# Patient Record
Sex: FEMALE | Race: WHITE | NOT HISPANIC OR LATINO | Employment: OTHER | URBAN - METROPOLITAN AREA
[De-identification: names, ages, dates, MRNs, and addresses within clinical notes are randomized per-mention and may not be internally consistent; named-entity substitution may affect disease eponyms.]

---

## 2017-02-22 ASSESSMENT — ENCOUNTER SYMPTOMS
RESPIRATORY PAIN: 0
WEIGHT GAIN: 0
WHEEZING: 0
DECREASED APPETITE: 0
EYE WATERING: 1
FEVER: 1
SNORES LOUDLY: 0
COUGH DISTURBING SLEEP: 0
SMELL DISTURBANCE: 0
SORE THROAT: 0
SHORTNESS OF BREATH: 0
EYE IRRITATION: 1
FATIGUE: 1
POLYDIPSIA: 0
SPUTUM PRODUCTION: 1
DOUBLE VISION: 0
COUGH: 1
WEIGHT LOSS: 0
HALLUCINATIONS: 0
TASTE DISTURBANCE: 0
HEMOPTYSIS: 0
EYE REDNESS: 0
ALTERED TEMPERATURE REGULATION: 1
NIGHT SWEATS: 0
TROUBLE SWALLOWING: 0
POSTURAL DYSPNEA: 0
NECK MASS: 0
SINUS CONGESTION: 1
EYE PAIN: 0
DYSPNEA ON EXERTION: 0
CHILLS: 0
INCREASED ENERGY: 0
HOARSE VOICE: 0
SINUS PAIN: 0
POLYPHAGIA: 0

## 2017-02-27 ENCOUNTER — ONCOLOGY VISIT (OUTPATIENT)
Dept: ONCOLOGY | Facility: CLINIC | Age: 77
End: 2017-02-27
Attending: PHYSICIAN ASSISTANT
Payer: COMMERCIAL

## 2017-02-27 VITALS
BODY MASS INDEX: 21.3 KG/M2 | HEART RATE: 68 BPM | RESPIRATION RATE: 16 BRPM | DIASTOLIC BLOOD PRESSURE: 74 MMHG | TEMPERATURE: 97.8 F | WEIGHT: 148.8 LBS | SYSTOLIC BLOOD PRESSURE: 118 MMHG | HEIGHT: 70 IN | OXYGEN SATURATION: 99 %

## 2017-02-27 DIAGNOSIS — C50.912 MALIGNANT NEOPLASM OF LEFT FEMALE BREAST, UNSPECIFIED SITE OF BREAST: Primary | ICD-10-CM

## 2017-02-27 PROCEDURE — 99214 OFFICE O/P EST MOD 30 MIN: CPT | Mod: ZP | Performed by: PHYSICIAN ASSISTANT

## 2017-02-27 PROCEDURE — 99212 OFFICE O/P EST SF 10 MIN: CPT | Mod: ZF

## 2017-02-27 ASSESSMENT — PAIN SCALES - GENERAL: PAINLEVEL: NO PAIN (0)

## 2017-02-27 NOTE — LETTER
2/27/2017      RE: Radhika Williamson  [address]  [place] [address]       DIAGNOSIS:  Stage I (T1 N0 M0) left breast cancer.  ER/OR was positive. HER2 was equivocal by FISH. Her Her 2 IHC was 1+.  Ms. Williamson had a screening mammogram which noted an abnormality in her left breast in 12/2015. She had a left breast biopsy on 12/17/2015. Pathology showed infiltrating ductal carcinoma, grade 2. There was associated DCIS. ER/OR was positive. HER2 was equivocal by FISH. Her Her 2 IHC was 1+. She had a breast MRI on 01/26/2016. MRI showed a left breast spiculated mass of 1.4 x 1.6 x 1.5 cm. There were no abnormal axillary lymph nodes. The right side showed no concerns. She had a left lumpectomy and sentinel lymph node biopsy on 02/24/2016. Pathology revealed a 1.7-cm tumor, grade 3, infiltrating ductal carcinoma. There was associated DCIS. Zero of 1 lymph node was positive. There were clear margins.  She had an Oncotype DX score of 19. She completed left breast radiation of 5240 cGy on 05/06/2016. She started Arimidex in 05/2016.     INTERVAL HISTORY:  Radhika Williamson returns to the clinic today for followup of her breast carcinoma.  Overall, she is feeling well at this time.  She did develop in the last 30 days influenza A.  She developed a high-grade fever.  She went in within 24 hours, and was prescribed Tamiflu and did fine.  She remains on the anastrozole.  She denies any hot flashes or joint pains.  She does continue to have some fatigue.  She is going to [facility] 2 days a week.  She does cristina chi and yoga.  She was interested in adding in some weight training as well.  She has no changes to her hearing.  She continues to have hearing loss and tinnitus, as well as a hearing aid.  She does have occasional bleeding of the gums.  She has dry, watery eyes, but uses Systane with good results.      She states that she now saw a nutritionist through Dr. Reyes's office, and this went well.  She was also  referred to a cognitive behavioral therapist to help with her sleep, and she is off most of the medications to help her sleep that she has been on it for years.     ROS:  Answers for HPI/ROS submitted by the patient on 2/22/2017   General Symptoms: Yes  Skin Symptoms: No  HENT Symptoms: Yes  EYE SYMPTOMS: Yes  HEART SYMPTOMS: No  LUNG SYMPTOMS: Yes  INTESTINAL SYMPTOMS: No  URINARY SYMPTOMS: No  GYNECOLOGIC SYMPTOMS: No  BREAST SYMPTOMS: No  SKELETAL SYMPTOMS: No  BLOOD SYMPTOMS: No  NERVOUS SYSTEM SYMPTOMS: No  MENTAL HEALTH SYMPTOMS: No  Fever: Yes  Loss of appetite: No  Weight loss: No  Weight gain: No  Fatigue: Yes  Night sweats: No  Chills: No  Increased stress: No  Excessive hunger: No  Excessive thirst: No  Feeling hot or cold when others believe the temperature is normal: Yes  Loss of height: No  Post-operative complications: No  Surgical site pain: No  Hallucinations: No  Change in or Loss of Energy: No  Hyperactivity: No  Confusion: No  Ear pain: No  Ear discharge: No  Hearing loss: Yes  Tinnitus: Yes  Nosebleeds: No  Congestion: Yes  Sinus pain: No  Trouble swallowing: No   Voice hoarseness: No  Mouth sores: No  Sore throat: No  Tooth pain: No  Gum tenderness: No  Bleeding gums: Yes  Change in taste: No  Change in sense of smell: No  Dry mouth: No  Hearing aid used: Yes  Neck lump: No  Eye pain: No  Vision loss: No  Dry eyes: Yes  Watery eyes: Yes  Eye bulging: No  Double vision: No  Flashing of lights: No  Spots: No  Floaters: No  Redness: No  Crossed eyes: No  Tunnel Vision: No  Yellowing of eyes: No  Eye irritation: Yes  Cough: Yes  Sputum or phlegm: Yes  Coughing up blood: No  Difficulty breating or shortness of breath: No  Snoring: No  Wheezing: No  Difficulty breathing on exertion: No  Respiratory pain: No  Nighttime Cough: No  Difficulty breathing when lying flat: No    MEDICATIONS:   Current Outpatient Prescriptions   Medication Sig Dispense Refill     Nutritional Supplements (IMMUNE ENHANCE PO)  "daily        clobetasol (TEMOVATE) 0.05 % ointment Apply to affected areas on arms and legs twice a day for up to two weeks at a time as needed for severe flares.       desonide (DESOWEN) 0.05 % cream Apply to affected areas on ears and groin twice a day as needed.       anastrozole (ARIMIDEX) 1 MG tablet Take 1 tablet (1 mg) by mouth daily 90 tablet 3     fluocinonide (LIDEX) 0.05 % external solution apply to scalp twice daily as needed       calcium citrate-vitamin D (CITRACAL) 315-250 MG-UNIT TABS Take 2 tablets by mouth daily 500 mg daily       Probiotic Product (PROBIOTIC DAILY) CAPS Take 2 capsules by mouth daily       KRILL OIL PO Take 500 mg by mouth daily        Cholecalciferol (VITAMIN D3 PO) Take 2,000 Units by mouth daily        multivitamin, therapeutic with minerals (THERA-VIT-M) TABS Take 1 tablet by mouth daily       Ranitidine HCl (ZANTAC PO) Take 150 mg by mouth as needed        TRAZODONE HCL PO Take 50 mg by mouth At Bedtime       Valerian 500 MG CAPS Take by mouth At Bedtime        MELATONIN PO Take 3 mg by mouth nightly as needed           ALLERGIES:    Allergies   Allergen Reactions     Molds & Smuts Other (See Comments)     Other reaction(s): Other, see comments     Dust Mite Extract Other (See Comments)     Other reaction(s): Other, see comments     Dust Mites      Mold        PHYSICAL EXAMINATION:  Vitals: /74 (BP Location: Left arm, Patient Position: Chair, Cuff Size: Adult Regular)  Pulse 68  Temp 97.8  F (36.6  C) (Oral)  Resp 16  Ht 1.785 m (5' 10.28\")  Wt 67.5 kg (148 lb 12.8 oz)  SpO2 99%  BMI 21.18 kg/m2  GENERAL:  A pleasant person in no acute distress.   HEENT:  Sclerae are nonicteric.     NECK:  Supple.   LYMPH NODES:  No peripheral lymphadenopathy noted in the axillary, supraclavicular, or cervical regions.   LUNGS:  Clear to auscultation bilaterally.   HEART:  Regular rate and rhythm, with no murmur appreciated.   ABDOMEN:  Bowel sounds are active.  Soft and nontender.  No " hepatosplenomegaly or other masses appreciated.  LOWER EXTREMITIES:  Without pitting edema to the knees bilaterally.   NEUROLOGICAL:  Alert/orientated/able to answer all questions.  CN grossly intact.  BREAST: Right breast normal to inspection, no masses. Left breast, well healed surgical incision 2-3 o'clock position. Left nipple is notable for a scab, no nipple discharge.  No masses.       IMPRESSION/PLAN:   1.  Stage I (T1 N0 M0) left breast carcinoma, ER/NC-positive, HER2 equivocal by FISH, 1+ by IHC.  Ms. Williamson continues to do well at this time.  She does not have any signs or symptoms that would suggest recurrence of her breast carcinoma.  Plan to continue the Arimidex 1 mg daily.  She had her bilateral mammogram in 11/2016, which showed benign findings.  We will plan to continue on an annual basis.  I discussed an exercise program, and asked her to increase her cardiovascular exercise to 150 minutes per week.  She is also interested in weight training, and I will refer her to cancer rehab.  The patient already has a followup appointment scheduled with Dr. Alves in 3 months.   2.  Bone health.  She does weightbearing exercises 2-3 days a week.  She also takes calcium with vitamin D.  DEXA scan in 04/2015 showed normal bone density.      If there are no interval concerns, the patient will follow up in 3 months.         Nupur Nazario PA-C

## 2017-02-27 NOTE — MR AVS SNAPSHOT
After Visit Summary   2/27/2017    Radhika Williamson    MRN: [id number]           Patient Information     Date Of Birth          1940        Visit Information        Provider Department      2/27/2017 9:30 AM Nupur Nazario PA-C [facility]        Today's Diagnoses     Malignant neoplasm of left female breast, unspecified site of breast (H)    -  1       Follow-ups after your visit        Additional Services     PHYSICAL THERAPY REFERRAL       *This therapy referral will be filtered to a centralized scheduling office at [facility] and the patient will receive a call to schedule an appointment at a [facility] location most convenient for them. *     [facility] provides Physical Therapy evaluation and treatment and many specialty services across the [facility] system.  If requesting a specialty program, please choose from the list below.    If you have not heard from the scheduling office within 2 business days, please call [phone] for all locations, with the exception of [place], please call [phone].  Treatment: Evaluation & Treatment  Special Instructions/Modalities: Strength training, cardio exercise program.  Special Programs: Cancer Rehabilitation (PT and OT Evaluate & Treat)    Please be aware that coverage of these services is subject to the terms and limitations of your health insurance plan.  Call member services at your health plan with any benefit or coverage questions.      **Note to Provider:  If you are referring outside of [facility] for the therapy appointment, please list the name of the location in the  special instructions  above, print the referral and give to the patient to schedule the appointment.                  Your next 10 appointments already scheduled     Mar 13, 2017  9:45 AM CDT   Evaluation with Chio Chaparro, PT   [facility] ([facility])     "[address] [facility]  1st Floor  [address]  [place] [address]   [phone]            May 22, 2017  9:00 AM CDT   (Arrive by 8:45 AM)   Return Visit with Cindy Alves MD   [facility] ([facility])    [address]  2nd Floor  [place] [id number] [phone]              Who to contact     If you have questions or need follow up information about today's clinic visit or your schedule please contact [facility] directly at [phone].  Normal or non-critical lab and imaging results will be communicated to you by MyChart, letter or phone within 4 business days after the clinic has received the results. If you do not hear from us within 7 days, please contact the clinic through [employer] or phone. If you have a critical or abnormal lab result, we will notify you by phone as soon as possible.  Submit refill requests through [employer] or call your pharmacy and they will forward the refill request to us. Please allow 3 business days for your refill to be completed.          Additional Information About Your Visit        Synker[employer]aloo Information     [employer] gives you secure access to your electronic health record. If you see a primary care provider, you can also send messages to your care team and make appointments. If you have questions, please call your primary care clinic.  If you do not have a primary care provider, please call [phone] and they will assist you.        Care EveryWhere ID     This is your Care EveryWhere ID. This could be used by other organizations to access your [place] medical records  [id number]        Your Vitals Were     Pulse Temperature Respirations Height Pulse Oximetry BMI (Body Mass Index)    68 97.8  F (36.6  C) (Oral) 16 1.785 m (5' 10.28\") 99% 21.18 kg/m2       Blood Pressure from Last 3 Encounters:   02/27/17 118/74   11/28/16 130/70   08/23/16 129/66    Weight from Last 3 Encounters: "   02/27/17 67.5 kg (148 lb 12.8 oz)   11/28/16 66.5 kg (146 lb 8 oz)   08/23/16 66.2 kg (146 lb)              We Performed the Following     PHYSICAL THERAPY REFERRAL        Primary Care Provider Office Phone # Fax #    Mckenna Fish [phone]       [address] [address]        Thank you!     Thank you for choosing [facility]  for your care. Our goal is always to provide you with excellent care. Hearing back from our patients is one way we can continue to improve our services. Please take a few minutes to complete the written survey that you may receive in the mail after your visit with us. Thank you!             Your Updated Medication List - Protect others around you: Learn how to safely use, store and throw away your medicines at www.disposemymeds.org.          This list is accurate as of: 2/27/17 11:59 PM.  Always use your most recent med list.                   Brand Name Dispense Instructions for use    anastrozole 1 MG tablet    ARIMIDEX    90 tablet    Take 1 tablet (1 mg) by mouth daily       calcium citrate-vitamin D 315-250 MG-UNIT Tabs per tablet    CITRACAL     Take 2 tablets by mouth daily 500 mg daily       clobetasol 0.05 % ointment    TEMOVATE     Apply to affected areas on arms and legs twice a day for up to two weeks at a time as needed for severe flares.       desonide 0.05 % cream    DESOWEN     Apply to affected areas on ears and groin twice a day as needed.       fluocinonide 0.05 % solution    LIDEX     apply to scalp twice daily as needed       IMMUNE ENHANCE PO      daily       KRILL OIL PO      Take 500 mg by mouth daily       MELATONIN PO      Take 3 mg by mouth nightly as needed       multivitamin, therapeutic with minerals Tabs tablet      Take 1 tablet by mouth daily       PROBIOTIC DAILY Caps      Take 2 capsules by mouth daily       TRAZODONE HCL PO      Take 50 mg by mouth At Bedtime       Valerian 500 MG  Caps      Take by mouth At Bedtime       VITAMIN D3 PO      Take 2,000 Units by mouth daily       ZANTAC PO      Take 150 mg by mouth as needed

## 2017-02-27 NOTE — NURSING NOTE
"Radhika Williamson is a 76 year old female who presents for:  Chief Complaint   Patient presents with     Oncology Clinic Visit     Breast Ca        Initial Vitals:  /74 (BP Location: Left arm, Patient Position: Chair, Cuff Size: Adult Regular)  Pulse 68  Temp 97.8  F (36.6  C) (Oral)  Resp 16  Ht 1.785 m (5' 10.28\")  Wt 67.5 kg (148 lb 12.8 oz)  SpO2 99%  BMI 21.18 kg/m2 Estimated body mass index is 21.18 kg/(m^2) as calculated from the following:    Height as of this encounter: 1.785 m (5' 10.28\").    Weight as of this encounter: 67.5 kg (148 lb 12.8 oz).. Body surface area is 1.83 meters squared. BP completed using cuff size: regular  No Pain (0) No LMP recorded. Patient is postmenopausal. Allergies and medications reviewed.     Medications: Medication refills not needed today.  Pharmacy name entered into EPIC: Data Unavailable    Comments:  Anastrazole    7 minutes for nursing intake (face to face time)   Kaylie Marcos LPN        "

## 2017-02-27 NOTE — PROGRESS NOTES
DIAGNOSIS:  Stage I (T1 N0 M0) left breast cancer.  ER/ND was positive. HER2 was equivocal by FISH. Her Her 2 IHC was 1+.  Ms. Williamson had a screening mammogram which noted an abnormality in her left breast in 12/2015. She had a left breast biopsy on 12/17/2015. Pathology showed infiltrating ductal carcinoma, grade 2. There was associated DCIS. ER/ND was positive. HER2 was equivocal by FISH. Her Her 2 IHC was 1+. She had a breast MRI on 01/26/2016. MRI showed a left breast spiculated mass of 1.4 x 1.6 x 1.5 cm. There were no abnormal axillary lymph nodes. The right side showed no concerns. She had a left lumpectomy and sentinel lymph node biopsy on 02/24/2016. Pathology revealed a 1.7-cm tumor, grade 3, infiltrating ductal carcinoma. There was associated DCIS. Zero of 1 lymph node was positive. There were clear margins.  She had an Oncotype DX score of 19. She completed left breast radiation of 5240 cGy on 05/06/2016. She started Arimidex in 05/2016.     INTERVAL HISTORY:  Radhika Williamson returns to the clinic today for followup of her breast carcinoma.  Overall, she is feeling well at this time.  She did develop in the last 30 days influenza A.  She developed a high-grade fever.  She went in within 24 hours, and was prescribed Tamiflu and did fine.  She remains on the anastrozole.  She denies any hot flashes or joint pains.  She does continue to have some fatigue.  She is going to [employer] cardio 2 days a week.  She does cristina chi and yoga.  She was interested in adding in some weight training as well.  She has no changes to her hearing.  She continues to have hearing loss and tinnitus, as well as a hearing aid.  She does have occasional bleeding of the gums.  She has dry, watery eyes, but uses Systane with good results.      She states that she now saw a nutritionist through Dr. Reyes's office, and this went well.  She was also referred to a cognitive behavioral therapist to help with her sleep, and she is  off most of the medications to help her sleep that she has been on it for years.     ROS:  Answers for HPI/ROS submitted by the patient on 2/22/2017   General Symptoms: Yes  Skin Symptoms: No  HENT Symptoms: Yes  EYE SYMPTOMS: Yes  HEART SYMPTOMS: No  LUNG SYMPTOMS: Yes  INTESTINAL SYMPTOMS: No  URINARY SYMPTOMS: No  GYNECOLOGIC SYMPTOMS: No  BREAST SYMPTOMS: No  SKELETAL SYMPTOMS: No  BLOOD SYMPTOMS: No  NERVOUS SYSTEM SYMPTOMS: No  MENTAL HEALTH SYMPTOMS: No  Fever: Yes  Loss of appetite: No  Weight loss: No  Weight gain: No  Fatigue: Yes  Night sweats: No  Chills: No  Increased stress: No  Excessive hunger: No  Excessive thirst: No  Feeling hot or cold when others believe the temperature is normal: Yes  Loss of height: No  Post-operative complications: No  Surgical site pain: No  Hallucinations: No  Change in or Loss of Energy: No  Hyperactivity: No  Confusion: No  Ear pain: No  Ear discharge: No  Hearing loss: Yes  Tinnitus: Yes  Nosebleeds: No  Congestion: Yes  Sinus pain: No  Trouble swallowing: No   Voice hoarseness: No  Mouth sores: No  Sore throat: No  Tooth pain: No  Gum tenderness: No  Bleeding gums: Yes  Change in taste: No  Change in sense of smell: No  Dry mouth: No  Hearing aid used: Yes  Neck lump: No  Eye pain: No  Vision loss: No  Dry eyes: Yes  Watery eyes: Yes  Eye bulging: No  Double vision: No  Flashing of lights: No  Spots: No  Floaters: No  Redness: No  Crossed eyes: No  Tunnel Vision: No  Yellowing of eyes: No  Eye irritation: Yes  Cough: Yes  Sputum or phlegm: Yes  Coughing up blood: No  Difficulty breating or shortness of breath: No  Snoring: No  Wheezing: No  Difficulty breathing on exertion: No  Respiratory pain: No  Nighttime Cough: No  Difficulty breathing when lying flat: No    MEDICATIONS:   Current Outpatient Prescriptions   Medication Sig Dispense Refill     Nutritional Supplements (IMMUNE ENHANCE PO) daily        clobetasol (TEMOVATE) 0.05 % ointment Apply to affected areas on  "arms and legs twice a day for up to two weeks at a time as needed for severe flares.       desonide (DESOWEN) 0.05 % cream Apply to affected areas on ears and groin twice a day as needed.       anastrozole (ARIMIDEX) 1 MG tablet Take 1 tablet (1 mg) by mouth daily 90 tablet 3     fluocinonide (LIDEX) 0.05 % external solution apply to scalp twice daily as needed       calcium citrate-vitamin D (CITRACAL) 315-250 MG-UNIT TABS Take 2 tablets by mouth daily 500 mg daily       Probiotic Product (PROBIOTIC DAILY) CAPS Take 2 capsules by mouth daily       KRILL OIL PO Take 500 mg by mouth daily        Cholecalciferol (VITAMIN D3 PO) Take 2,000 Units by mouth daily        multivitamin, therapeutic with minerals (THERA-VIT-M) TABS Take 1 tablet by mouth daily       Ranitidine HCl (ZANTAC PO) Take 150 mg by mouth as needed        TRAZODONE HCL PO Take 50 mg by mouth At Bedtime       Valerian 500 MG CAPS Take by mouth At Bedtime        MELATONIN PO Take 3 mg by mouth nightly as needed           ALLERGIES:    Allergies   Allergen Reactions     Molds & Smuts Other (See Comments)     Other reaction(s): Other, see comments     Dust Mite Extract Other (See Comments)     Other reaction(s): Other, see comments     Dust Mites      Mold        PHYSICAL EXAMINATION:  Vitals: /74 (BP Location: Left arm, Patient Position: Chair, Cuff Size: Adult Regular)  Pulse 68  Temp 97.8  F (36.6  C) (Oral)  Resp 16  Ht 1.785 m (5' 10.28\")  Wt 67.5 kg (148 lb 12.8 oz)  SpO2 99%  BMI 21.18 kg/m2  GENERAL:  A pleasant person in no acute distress.   HEENT:  Sclerae are nonicteric.     NECK:  Supple.   LYMPH NODES:  No peripheral lymphadenopathy noted in the axillary, supraclavicular, or cervical regions.   LUNGS:  Clear to auscultation bilaterally.   HEART:  Regular rate and rhythm, with no murmur appreciated.   ABDOMEN:  Bowel sounds are active.  Soft and nontender.  No hepatosplenomegaly or other masses appreciated.  LOWER EXTREMITIES:  Without " pitting edema to the knees bilaterally.   NEUROLOGICAL:  Alert/orientated/able to answer all questions.  CN grossly intact.  BREAST: Right breast normal to inspection, no masses. Left breast, well healed surgical incision 2-3 o'clock position. Left nipple is notable for a scab, no nipple discharge.  No masses.       IMPRESSION/PLAN:   1.  Stage I (T1 N0 M0) left breast carcinoma, ER/TN-positive, HER2 equivocal by FISH, 1+ by IHC.  Ms. Williamson continues to do well at this time.  She does not have any signs or symptoms that would suggest recurrence of her breast carcinoma.  Plan to continue the Arimidex 1 mg daily.  She had her bilateral mammogram in 11/2016, which showed benign findings.  We will plan to continue on an annual basis.  I discussed an exercise program, and asked her to increase her cardiovascular exercise to 150 minutes per week.  She is also interested in weight training, and I will refer her to cancer rehab.  The patient already has a followup appointment scheduled with Dr. Alves in 3 months.   2.  Bone health.  She does weightbearing exercises 2-3 days a week.  She also takes calcium with vitamin D.  DEXA scan in 04/2015 showed normal bone density.      If there are no interval concerns, the patient will follow up in 3 months.

## 2017-03-13 ENCOUNTER — HOSPITAL ENCOUNTER (OUTPATIENT)
Dept: PHYSICAL THERAPY | Facility: CLINIC | Age: 77
Setting detail: THERAPIES SERIES
End: 2017-03-13
Attending: PHYSICIAN ASSISTANT
Payer: MEDICARE

## 2017-03-13 PROCEDURE — 40000360 ZZHC STATISTIC PT CANCER REHAB VISIT: Performed by: PHYSICAL THERAPIST

## 2017-03-13 PROCEDURE — G8978 MOBILITY CURRENT STATUS: HCPCS | Mod: GP,CJ | Performed by: PHYSICAL THERAPIST

## 2017-03-13 PROCEDURE — 97110 THERAPEUTIC EXERCISES: CPT | Mod: GP | Performed by: PHYSICAL THERAPIST

## 2017-03-13 PROCEDURE — G8979 MOBILITY GOAL STATUS: HCPCS | Mod: GP,CI | Performed by: PHYSICAL THERAPIST

## 2017-03-13 PROCEDURE — 97535 SELF CARE MNGMENT TRAINING: CPT | Mod: GP | Performed by: PHYSICAL THERAPIST

## 2017-03-13 PROCEDURE — 97162 PT EVAL MOD COMPLEX 30 MIN: CPT | Mod: GP | Performed by: PHYSICAL THERAPIST

## 2017-03-13 PROCEDURE — 97161 PT EVAL LOW COMPLEX 20 MIN: CPT | Mod: GP | Performed by: PHYSICAL THERAPIST

## 2017-03-13 ASSESSMENT — 6 MINUTE WALK TEST (6MWT): TOTAL DISTANCE WALKED (METERS): 513

## 2017-03-15 ENCOUNTER — HOSPITAL ENCOUNTER (OUTPATIENT)
Dept: PHYSICAL THERAPY | Facility: CLINIC | Age: 77
Setting detail: THERAPIES SERIES
End: 2017-03-15
Attending: PHYSICIAN ASSISTANT
Payer: MEDICARE

## 2017-03-15 PROCEDURE — 40000360 ZZHC STATISTIC PT CANCER REHAB VISIT: Performed by: PHYSICAL THERAPIST

## 2017-03-15 PROCEDURE — 97110 THERAPEUTIC EXERCISES: CPT | Mod: GP | Performed by: PHYSICAL THERAPIST

## 2017-03-15 ASSESSMENT — 6 MINUTE WALK TEST (6MWT): TOTAL DISTANCE WALKED (METERS): 513

## 2017-03-17 NOTE — PROGRESS NOTES
" 03/13/17 1900   Quick Adds   Quick Adds Certification   Type of Visit Initial OP PT Evaluation   General Information   Start of Care Date 03/13/17   Referring Physician Nupur Nazario PA-C   Orders Evaluate and Treat as Indicated   Additional Orders strength training, cardio exercise program   Order Date 03/27/17   Medical Diagnosis malignant neoplasm of left female breast, unspecified site of breast   Onset of illness/injury or Date of Surgery 02/24/16   Precautions/Limitations no known precautions/limitations   Surgical/Medical history reviewed Yes   Pertinent history of current problem (include personal factors and/or comorbidities that impact the POC) patient dx with left breast cancer 12/2015, left lumpectomy and sentinel LN biopsy 2/24/16 (0/1 LN positive), radiation completed 5/6/2016. other medical hx includes: sleep apnea (cpap). current exercise program consists of silver sneakers 2 days/week, cristina chi 1x/week, yoga 1x/week.    Diagnostic Tests Bone Scan   Bone Scan Results (Dexa scan in 4/2015 showed normal bone density)   Current Community Support Family/friend caregiver   Patient role/Employment history Retired   Living environment [place]   Patient/Family Goals Statement patient started on arimidex 5/2016 \"I want to make sure I'm doing everything possible to keep my bones strong because of the inhibitor\"    General Information Comments currently patient performs 45 minutes of yoga 1x/week, 90 minutes of cristina chi 1x/week,  silver sneakers 2x/week (20 minutes of aerobics, 8-10 minutes of light UE exercises, 8-10 minutes of pilates stretching) \"I also stay sometimes after the [facility] and do some leg exercises on machines\"    Fall Risk Screen   Fall screen completed by PT   Per patient - Fall 2 or more times in past year? No   Per patient - Fall with injury in past year? Yes   Timed Up and Go score (seconds) 6.94   Is patient a fall risk? No   Fall screen comments patient " "reporting fell because [facility] recently   System Outcome Measures   Outcome Measures Cancer Rehab   FACIT Fatigue Subscale (score out of 52). The higher the score, the better the QOL. 49   Six Minute Walk (meters). An increase of 70 or more meters indicates statistically significant change. 513   Functional Scales   Functional Scales and Outcomes FACIT: 49/52 DIFFICULTY: A LITTLE BIT: i feel fatigue, i feel tired, i need to sleep during the day. LEFS: DIFFICULTY: MODERATE: running on uneven ground. A LITTLE BIT OF DIFFICULTY: putting on shoes/socks, performing heavy activites around home, making sharp turns while running fast. SPADI: PAIN: at its worse 2/10, while lying on involved side 2/10, DIFFICULTY: carrying a heavy object of 10 pounds 2/10   Pain   Patient currently in pain Yes   Pain location left shoulder   Pain rating 5-6/10   Pain description comment \"I've had this pain even before surgery and it's only lifting weights over my head, i can do 5# on the right but only 3# on the left\"    Additional pain locations? Pain location 2   Pain location 2 left UE   Pain rating 2 2/10   Pain comments difficulty lying on involved side   Vital Signs   Vital Signs Other Vital Signs   Other Vital Signs BMI: 21.18   Cognitive Status Examination   Orientation orientation to person, place and time   Level of Consciousness alert   Follows Commands and Answers Questions 100% of the time   Personal Safety and Judgment intact   Memory intact   Integumentary   Integumentary Comments no reports of UE edema   Range of Motion (ROM)   ROM Comment WFL   Planned Therapy Interventions   Planned Therapy Interventions neuromuscular re-education;strengthening;stretching   Clinical Impression   Criteria for Skilled Therapeutic Interventions Met yes, treatment indicated   PT Diagnosis fatigue, weakness   Influenced by the following impairments pain left UE, difficuly carrying 10#, reporting fatigue, " "tired, occasional needing to sleep during the day.   Functional limitations due to impairments pain, fatigue, weakness   Clinical Presentation Evolving/Changing   Clinical Presentation Rationale patient currently performing exercise programs but reports not difficult \"there are 60 people in the silver sneakers class so we don't get individual instruction\" patient using very light weights when performing UE ex, no specific LE exercises, no specific walking program to prevent cardiovascular health. patient very motivated to learn how to advance UE weights and improve cardio training.    Clinical Decision Making (Complexity) Moderate complexity   Therapy Frequency 2 times/Week   Predicted Duration of Therapy Intervention (days/wks) 6 weeks   Risk & Benefits of therapy have been explained Yes   Patient, Family & other staff in agreement with plan of care Yes   Clinical Impression Comments patient with dx left breast cancer, lumpectomy with sentinel LND. currently performing exercise program but is not clear how to progress strengthening and aerobic exercises for maximum benefit. recommending treatment to instruct and monitor advancement for home exercise program.    Education Assessment   Preferred Learning Style Listening;Reading;Demonstration;Pictures/video   Barriers to Learning No barriers   GOALS   PT Eval Goals 1   Goal 1   Goal Identifier HOME PROGRAM   Goal Description patient to demonstrate understanding of advancement and monitoring of home strengthening and aerobic exercise program to achieve maximum benefit to prevent increased problems with lifestyle changes.    Target Date 06/10/17   Total Evaluation Time   Total Evaluation Time (Minutes) 20   Therapy Certification   Certification date from 03/13/17   Certification date to 06/10/17   Medical Diagnosis malignant neoplasm of left female breast, unspecified site of breast   Certification I certify the need for these services furnished under this plan of " treatment and while under my care.  (Physician co-signature of this document indicates review and certification of the therapy plan).

## 2017-03-17 NOTE — PROGRESS NOTES
[facility]        OUTPATIENT PHYSICAL THERAPY FUNCTIONAL EVALUATION  PLAN OF TREATMENT FOR OUTPATIENT REHABILITATION  (COMPLETE FOR INITIAL CLAIMS ONLY)  Patient's Last Name, First Name, M.I.  YOB: 1940  Radhika Williamson     Provider's Name   [facility]   Medical Record No.  [id number]     Start of Care Date:  03/13/17   Onset Date:  02/24/16   Type:     _X__PT   ____OT  ____SLP Medical Diagnosis:  malignant neoplasm of left female breast, unspecified site of breast     PT Diagnosis:  fatigue, weakness Visits from SOC:  1                              __________________________________________________________________________________  Plan of Treatment/Functional Goals:  neuromuscular re-education, strengthening, stretching           GOALS  HOME PROGRAM  patient to demonstrate understanding of advancement and monitoring of home strengthening and aerobic exercise program to achieve maximum benefit to prevent increased problems with lifestyle changes.   06/10/17             Therapy Frequency:  2 times/Week   Predicted Duration of Therapy Intervention:  6 weeks    Ciho Chaparro, PT                                    I CERTIFY THE NEED FOR THESE SERVICES FURNISHED UNDER        THIS PLAN OF TREATMENT AND WHILE UNDER MY CARE     (Physician co-signature of this document indicates review and certification of the therapy plan).                Certification Date From:  03/13/17   Certification Date To:  06/10/17    Referring Provider:  Nupur Nazario PA-C    Initial Assessment  See Epic Evaluation- Start of Care Date: 03/13/17

## 2017-03-22 ENCOUNTER — HOSPITAL ENCOUNTER (OUTPATIENT)
Dept: PHYSICAL THERAPY | Facility: CLINIC | Age: 77
Setting detail: THERAPIES SERIES
End: 2017-03-22
Attending: PHYSICIAN ASSISTANT
Payer: MEDICARE

## 2017-03-22 PROCEDURE — 40000360 ZZHC STATISTIC PT CANCER REHAB VISIT: Performed by: PHYSICAL THERAPIST

## 2017-03-22 PROCEDURE — 97110 THERAPEUTIC EXERCISES: CPT | Mod: GP | Performed by: PHYSICAL THERAPIST

## 2017-03-22 PROCEDURE — 97140 MANUAL THERAPY 1/> REGIONS: CPT | Mod: GP | Performed by: PHYSICAL THERAPIST

## 2017-03-24 ENCOUNTER — HOSPITAL ENCOUNTER (OUTPATIENT)
Dept: PHYSICAL THERAPY | Facility: CLINIC | Age: 77
Setting detail: THERAPIES SERIES
End: 2017-03-24
Attending: PHYSICIAN ASSISTANT
Payer: MEDICARE

## 2017-03-24 PROCEDURE — 40000360 ZZHC STATISTIC PT CANCER REHAB VISIT: Performed by: PHYSICAL THERAPIST

## 2017-03-24 PROCEDURE — 97110 THERAPEUTIC EXERCISES: CPT | Mod: GP | Performed by: PHYSICAL THERAPIST

## 2017-03-27 ENCOUNTER — HOSPITAL ENCOUNTER (OUTPATIENT)
Dept: PHYSICAL THERAPY | Facility: CLINIC | Age: 77
Setting detail: THERAPIES SERIES
End: 2017-03-27
Attending: PHYSICIAN ASSISTANT
Payer: MEDICARE

## 2017-03-27 PROCEDURE — 97140 MANUAL THERAPY 1/> REGIONS: CPT | Mod: GP | Performed by: PHYSICAL THERAPIST

## 2017-03-27 PROCEDURE — 97110 THERAPEUTIC EXERCISES: CPT | Mod: GP | Performed by: PHYSICAL THERAPIST

## 2017-03-27 PROCEDURE — 40000360 ZZHC STATISTIC PT CANCER REHAB VISIT: Performed by: PHYSICAL THERAPIST

## 2017-03-31 ENCOUNTER — HOSPITAL ENCOUNTER (OUTPATIENT)
Dept: PHYSICAL THERAPY | Facility: CLINIC | Age: 77
Setting detail: THERAPIES SERIES
End: 2017-03-31
Attending: PHYSICIAN ASSISTANT
Payer: MEDICARE

## 2017-03-31 PROCEDURE — 97110 THERAPEUTIC EXERCISES: CPT | Mod: GP | Performed by: PHYSICAL THERAPIST

## 2017-03-31 PROCEDURE — 40000360 ZZHC STATISTIC PT CANCER REHAB VISIT: Performed by: PHYSICAL THERAPIST

## 2017-04-03 ENCOUNTER — HOSPITAL ENCOUNTER (OUTPATIENT)
Dept: PHYSICAL THERAPY | Facility: CLINIC | Age: 77
Setting detail: THERAPIES SERIES
End: 2017-04-03
Attending: PHYSICIAN ASSISTANT
Payer: MEDICARE

## 2017-04-03 PROCEDURE — 40000360 ZZHC STATISTIC PT CANCER REHAB VISIT: Performed by: PHYSICAL THERAPIST

## 2017-04-03 PROCEDURE — 97110 THERAPEUTIC EXERCISES: CPT | Mod: GP | Performed by: PHYSICAL THERAPIST

## 2017-04-03 PROCEDURE — 97140 MANUAL THERAPY 1/> REGIONS: CPT | Mod: GP | Performed by: PHYSICAL THERAPIST

## 2017-04-05 ENCOUNTER — HOSPITAL ENCOUNTER (OUTPATIENT)
Dept: PHYSICAL THERAPY | Facility: CLINIC | Age: 77
Setting detail: THERAPIES SERIES
End: 2017-04-05
Attending: PHYSICIAN ASSISTANT
Payer: MEDICARE

## 2017-04-05 PROCEDURE — 40000360 ZZHC STATISTIC PT CANCER REHAB VISIT: Performed by: PHYSICAL THERAPIST

## 2017-04-05 PROCEDURE — 97140 MANUAL THERAPY 1/> REGIONS: CPT | Mod: GP | Performed by: PHYSICAL THERAPIST

## 2017-04-05 PROCEDURE — 97110 THERAPEUTIC EXERCISES: CPT | Mod: GP | Performed by: PHYSICAL THERAPIST

## 2017-04-12 ENCOUNTER — HOSPITAL ENCOUNTER (OUTPATIENT)
Dept: PHYSICAL THERAPY | Facility: CLINIC | Age: 77
Setting detail: THERAPIES SERIES
End: 2017-04-12
Attending: PHYSICIAN ASSISTANT
Payer: MEDICARE

## 2017-04-12 PROCEDURE — G8979 MOBILITY GOAL STATUS: HCPCS | Mod: GP,CI | Performed by: PHYSICAL THERAPIST

## 2017-04-12 PROCEDURE — 97110 THERAPEUTIC EXERCISES: CPT | Mod: GP | Performed by: PHYSICAL THERAPIST

## 2017-04-12 PROCEDURE — 97535 SELF CARE MNGMENT TRAINING: CPT | Mod: GP | Performed by: PHYSICAL THERAPIST

## 2017-04-12 PROCEDURE — 40000360 ZZHC STATISTIC PT CANCER REHAB VISIT: Performed by: PHYSICAL THERAPIST

## 2017-04-12 PROCEDURE — G8978 MOBILITY CURRENT STATUS: HCPCS | Mod: GP,CI | Performed by: PHYSICAL THERAPIST

## 2017-04-12 ASSESSMENT — 6 MINUTE WALK TEST (6MWT): TOTAL DISTANCE WALKED (METERS): 582

## 2017-04-12 NOTE — PROGRESS NOTES
Outpatient Physical Therapy Progress Note     Patient: Radhika Williamson  : 1940    Beginning/End Dates of Reporting Period:  3/13/17 to 2017    Referring Provider: Nupur [facility] Diagnosis: weakness of B LE/UEs, fatigue     Client Self Report:  Radhika reports minimal shoulder pain. She is eager to return to full yoga practice and is improving in shoulder strength which will enable her to do this.     Objective Measurements:  Objective Measure: FACIT, fatigue survey: lhigher scores indicate less fatigue.  Details: 50, initial score 49     Objective Measure: SIX MINUTE WALK  Details:  582 meters, initial : 513 meters       Outcome Measures (most recent score):  FACIT Fatigue Subscale (score out of 52). The higher the score, the better the QOL.: 50  Six Minute Walk (meters). An increase of 70 or more meters indicates statistically significant change.: 582 Pt increased 69 meters    Goals:  Goal Identifier HOME PROGRAM   Goal Description patient to demonstrate understanding of advancement and monitoring of home strengthening and aerobic exercise program to achieve maximum benefit to prevent increased problems with lifestyle management.   Target Date 06/10/17   Date Met      Progress:on going   Progress Toward Goals:   Progress this reporting period: Radhika does very well with her home exercise program. She has decreased fatigue and has increased her walking speed. She benefits from continued therapy to advance exercise program prior to returning to  so she can return to previous activities without shoulder, knee or back pain.       Plan:  Continue therapy per current plan of care.    Discharge: No

## 2017-04-14 ENCOUNTER — HOSPITAL ENCOUNTER (OUTPATIENT)
Dept: PHYSICAL THERAPY | Facility: CLINIC | Age: 77
Setting detail: THERAPIES SERIES
End: 2017-04-14
Attending: PHYSICIAN ASSISTANT
Payer: MEDICARE

## 2017-04-14 PROCEDURE — 97140 MANUAL THERAPY 1/> REGIONS: CPT | Mod: GP | Performed by: PHYSICAL THERAPIST

## 2017-04-14 PROCEDURE — 40000360 ZZHC STATISTIC PT CANCER REHAB VISIT: Performed by: PHYSICAL THERAPIST

## 2017-04-14 PROCEDURE — 97112 NEUROMUSCULAR REEDUCATION: CPT | Mod: GP | Performed by: PHYSICAL THERAPIST

## 2017-04-14 PROCEDURE — 97110 THERAPEUTIC EXERCISES: CPT | Mod: GP | Performed by: PHYSICAL THERAPIST

## 2017-04-18 ENCOUNTER — HOSPITAL ENCOUNTER (OUTPATIENT)
Dept: PHYSICAL THERAPY | Facility: CLINIC | Age: 77
Setting detail: THERAPIES SERIES
End: 2017-04-18
Attending: PHYSICIAN ASSISTANT
Payer: MEDICARE

## 2017-04-18 PROCEDURE — 97535 SELF CARE MNGMENT TRAINING: CPT | Mod: GP | Performed by: PHYSICAL THERAPIST

## 2017-04-18 PROCEDURE — 40000360 ZZHC STATISTIC PT CANCER REHAB VISIT: Performed by: PHYSICAL THERAPIST

## 2017-04-18 PROCEDURE — 97110 THERAPEUTIC EXERCISES: CPT | Mod: GP | Performed by: PHYSICAL THERAPIST

## 2017-04-21 ENCOUNTER — HOSPITAL ENCOUNTER (OUTPATIENT)
Dept: PHYSICAL THERAPY | Facility: CLINIC | Age: 77
Setting detail: THERAPIES SERIES
End: 2017-04-21
Attending: PHYSICIAN ASSISTANT
Payer: MEDICARE

## 2017-04-21 PROCEDURE — 40000360 ZZHC STATISTIC PT CANCER REHAB VISIT: Performed by: PHYSICAL THERAPIST

## 2017-04-21 PROCEDURE — 97140 MANUAL THERAPY 1/> REGIONS: CPT | Mod: GP | Performed by: PHYSICAL THERAPIST

## 2017-04-21 PROCEDURE — 97110 THERAPEUTIC EXERCISES: CPT | Mod: GP | Performed by: PHYSICAL THERAPIST

## 2017-04-26 ENCOUNTER — HOSPITAL ENCOUNTER (OUTPATIENT)
Dept: PHYSICAL THERAPY | Facility: CLINIC | Age: 77
Setting detail: THERAPIES SERIES
End: 2017-04-26
Attending: PHYSICIAN ASSISTANT
Payer: MEDICARE

## 2017-04-26 PROCEDURE — 97110 THERAPEUTIC EXERCISES: CPT | Mod: GP | Performed by: PHYSICAL THERAPIST

## 2017-04-26 PROCEDURE — 97140 MANUAL THERAPY 1/> REGIONS: CPT | Mod: GP | Performed by: PHYSICAL THERAPIST

## 2017-04-26 PROCEDURE — 40000360 ZZHC STATISTIC PT CANCER REHAB VISIT: Performed by: PHYSICAL THERAPIST

## 2017-04-28 ENCOUNTER — HOSPITAL ENCOUNTER (OUTPATIENT)
Dept: PHYSICAL THERAPY | Facility: CLINIC | Age: 77
Setting detail: THERAPIES SERIES
End: 2017-04-28
Attending: PHYSICIAN ASSISTANT
Payer: MEDICARE

## 2017-04-28 PROCEDURE — G8979 MOBILITY GOAL STATUS: HCPCS | Mod: GP,CI | Performed by: PHYSICAL THERAPIST

## 2017-04-28 PROCEDURE — G8980 MOBILITY D/C STATUS: HCPCS | Mod: GP,CH | Performed by: PHYSICAL THERAPIST

## 2017-04-28 PROCEDURE — 40000360 ZZHC STATISTIC PT CANCER REHAB VISIT: Performed by: PHYSICAL THERAPIST

## 2017-04-28 PROCEDURE — 97110 THERAPEUTIC EXERCISES: CPT | Mod: GP | Performed by: PHYSICAL THERAPIST

## 2017-04-28 PROCEDURE — 97140 MANUAL THERAPY 1/> REGIONS: CPT | Mod: GP | Performed by: PHYSICAL THERAPIST

## 2017-04-28 ASSESSMENT — 6 MINUTE WALK TEST (6MWT): TOTAL DISTANCE WALKED (METERS): 582

## 2017-04-28 NOTE — PROGRESS NOTES
Outpatient Physical Therapy Discharge Note     Patient: Radhika Williamson  : 1940    Beginning/End Dates of Reporting Period:  3/13/17 to 2017    Referring Provider: Nupur [facility] Diagnosis: B UE/LE weakness, fatigue     Client Self Report: Radhika feels confident in her ability to carry out a home and gym exercise program and knows how to advance it and increase intensity without increase in fatigue. She continues to have L posterior shoulder pain and may seek additional treatment with an orthopedic PT if pain doesn't resolve.     Objective Measurements:  Objective Measure: FACIT, a measure of fatigue. Max score= 54, higher scores indicate less fatigue.   Details: 53       Objective Measure: SIX MINUTE WALK  Details: 1920 feet, 582 meters, initial : 513 meters    Outcome Measures (most recent score):  FACIT Fatigue Subscale (score out of 52). The higher the score, the better the QOL.: 52  Six Minute Walk (meters). An increase of 70 or more meters indicates statistically significant change.: 582    Goals:  Goal Identifier HOME PROGRAM   Goal Description patient to demonstrate understanding of advancement and monitoring of home strengthening and aerobic exercise program to achieve maximum benefit to prevent increased problems with lifestyle management.   Target Date 06/10/17   Date Met   17   Progress:       Progress Toward Goals:   Progress this reporting period: Radhika has been very motivated and eager to learn. She has been instructed in weight lifting for UE/LE, balance exercises, stretches and aerobic training. She will continue to do [facility] [employer] [facility] [employer] and add these new exercises several times a week.     Plan:  Discharge from therapy.    Discharge:Yes    Reason for Discharge: Patient has met all goals.    Equipment Issued: none    Discharge Plan: Patient to continue home program.

## 2017-05-08 ASSESSMENT — ENCOUNTER SYMPTOMS
SINUS CONGESTION: 0
BACK PAIN: 0
HYPERTENSION: 0
JOINT SWELLING: 0
SORE THROAT: 0
EYE PAIN: 0
MUSCLE WEAKNESS: 0
SYNCOPE: 0
SINUS PAIN: 0
ORTHOPNEA: 0
MYALGIAS: 1
LEG PAIN: 0
MUSCLE CRAMPS: 0
SLEEP DISTURBANCES DUE TO BREATHING: 0
EXERCISE INTOLERANCE: 0
NECK PAIN: 0
TASTE DISTURBANCE: 0
SMELL DISTURBANCE: 0
NECK MASS: 0
EYE WATERING: 1
CLAUDICATION: 0
TACHYCARDIA: 1
STIFFNESS: 0
EYE REDNESS: 0
PALPITATIONS: 1
HOARSE VOICE: 0
DOUBLE VISION: 0
EYE IRRITATION: 0
TROUBLE SWALLOWING: 0
LEG SWELLING: 0
ARTHRALGIAS: 1
HYPOTENSION: 0
LIGHT-HEADEDNESS: 0

## 2017-05-22 ENCOUNTER — ONCOLOGY VISIT (OUTPATIENT)
Dept: ONCOLOGY | Facility: CLINIC | Age: 77
End: 2017-05-22
Attending: INTERNAL MEDICINE
Payer: COMMERCIAL

## 2017-05-22 VITALS
WEIGHT: 150.3 LBS | HEART RATE: 68 BPM | OXYGEN SATURATION: 97 % | DIASTOLIC BLOOD PRESSURE: 68 MMHG | SYSTOLIC BLOOD PRESSURE: 132 MMHG | RESPIRATION RATE: 16 BRPM | BODY MASS INDEX: 21.52 KG/M2 | HEIGHT: 70 IN | TEMPERATURE: 98 F

## 2017-05-22 DIAGNOSIS — C50.912 MALIGNANT NEOPLASM OF LEFT FEMALE BREAST, UNSPECIFIED SITE OF BREAST: Primary | ICD-10-CM

## 2017-05-22 DIAGNOSIS — Z12.31 ENCOUNTER FOR SCREENING MAMMOGRAM FOR MALIGNANT NEOPLASM OF BREAST: ICD-10-CM

## 2017-05-22 PROCEDURE — 99214 OFFICE O/P EST MOD 30 MIN: CPT | Mod: GC | Performed by: INTERNAL MEDICINE

## 2017-05-22 PROCEDURE — 99212 OFFICE O/P EST SF 10 MIN: CPT | Mod: ZF

## 2017-05-22 RX ORDER — PHENOL 1.4 %
1 AEROSOL, SPRAY (ML) MUCOUS MEMBRANE DAILY
COMMUNITY
End: 2017-11-27

## 2017-05-22 ASSESSMENT — PAIN SCALES - GENERAL: PAINLEVEL: NO PAIN (0)

## 2017-05-22 NOTE — MR AVS SNAPSHOT
After Visit Summary   5/22/2017    Radhika Williamson    MRN: [id number]           Patient Information     Date Of Birth          1940        Visit Information        Provider Department      5/22/2017 9:00 AM Cindy Alves MD [facility]        Today's Diagnoses     Malignant neoplasm of left female breast, unspecified site of breast (H)    -  1    Encounter for screening mammogram for malignant neoplasm of breast            Follow-ups after your visit        Your next 10 appointments already scheduled     Nov 27, 2017  9:30 AM CST   (Arrive by 9:15 AM)   MA SCREENING DIGITAL BILATERAL with [id number]   [facility] ([facility])    [address] [place] [address] [phone]           Do not use any powder, lotion or deodorant under your arms or on your breast. If you do, we will ask you to remove it before your exam.  Wear comfortable, two-piece clothing.  If you have any allergies, tell your care team.  Bring any previous mammograms from other facilities or have them mailed to the breast center. This mammogram location, [facility], now offers 3D mammography. It doesn't replace a screening mammogram and can be done with a regular screening mammogram. It is optional and not all insurances will pay for it. 3D mammography is a special kind of mammogram that produces a three-dimensional image of the breast by using low dose-xrays. 3D allows the radiologist to see the breast tissue differently from 2D, which reduces the chance of repeat testing due to overlapping breast tissue. If you are interested in have a 3D mammogram, please check with your insurance before you arrive for your exam. 3D mammography is offered to all patients. On the day of your exam you will be asked to sign a form stating yes, you wish to have 3D imaging or, no, you decline.              Future tests that were  "ordered for you today     Open Future Orders        Priority Expected Expires Ordered    MA Screening Digital Bilateral Routine  5/22/2018 5/22/2017            Who to contact     If you have questions or need follow up information about today's clinic visit or your schedule please contact [facility] directly at [phone].  Normal or non-critical lab and imaging results will be communicated to you by MyChart, letter or phone within 4 business days after the clinic has received the results. If you do not hear from us within 7 days, please contact the clinic through [employer] or phone. If you have a critical or abnormal lab result, we will notify you by phone as soon as possible.  Submit refill requests through [email or web address] or call your pharmacy and they will forward the refill request to us. Please allow 3 business days for your refill to be completed.          Additional Information About Your Visit        [employer] Information     [email or web address] gives you secure access to your electronic health record. If you see a primary care provider, you can also send messages to your care team and make appointments. If you have questions, please call your primary care clinic.  If you do not have a primary care provider, please call [phone] and they will assist you.        Care EveryWhere ID     This is your Care EveryWhere ID. This could be used by other organizations to access your [facility] medical records  [id number]        Your Vitals Were     Pulse Temperature Respirations Height Pulse Oximetry BMI (Body Mass Index)    68 98  F (36.7  C) (Oral) 16 1.785 m (5' 10.28\") 97% 21.4 kg/m2       Blood Pressure from Last 3 Encounters:   05/22/17 132/68   02/27/17 118/74   11/28/16 130/70    Weight from Last 3 Encounters:   05/22/17 68.2 kg (150 lb 4.8 oz)   02/27/17 67.5 kg (148 lb 12.8 oz)   11/28/16 66.5 kg (146 lb 8 oz)                 Today's Medication Changes          These changes are accurate as of: 5/22/17  " 9:48 AM.  If you have any questions, ask your nurse or doctor.               Stop taking these medicines if you haven't already. Please contact your care team if you have questions.     UNABLE TO FIND   Stopped by:  Cindy Alves MD                    Primary Care Provider Office Phone # Fax #    Mckenna Fish [phone]       [address] [address]        Thank you!     Thank you for choosing [facility]  for your care. Our goal is always to provide you with excellent care. Hearing back from our patients is one way we can continue to improve our services. Please take a few minutes to complete the written survey that you may receive in the mail after your visit with us. Thank you!             Your Updated Medication List - Protect others around you: Learn how to safely use, store and throw away your medicines at www.disposemymeds.org.          This list is accurate as of: 5/22/17  9:48 AM.  Always use your most recent med list.                   Brand Name Dispense Instructions for use    anastrozole 1 MG tablet    ARIMIDEX    90 tablet    Take 1 tablet (1 mg) by mouth daily       calcium carbonate 600 MG tablet    OS-WILLARD 600 mg [place]. Ca     Take 1 tablet by mouth daily       calcium citrate-vitamin D 315-250 MG-UNIT Tabs per tablet    CITRACAL     Take 2 tablets by mouth daily 500 mg daily       clobetasol 0.05 % ointment    TEMOVATE     Apply to affected areas on arms and legs twice a day for up to two weeks at a time as needed for severe flares.       desonide 0.05 % cream    DESOWEN     Apply to affected areas on ears and groin twice a day as needed.       fluocinonide 0.05 % solution    LIDEX     apply to scalp twice daily as needed       IMMUNE ENHANCE PO      daily       KRILL OIL PO      Take 500 mg by mouth daily       MELATONIN PO      Take 3 mg by mouth nightly as needed       multivitamin, therapeutic with minerals Tabs  tablet      Take 1 tablet by mouth daily       PROBIOTIC DAILY Caps      Take 2 capsules by mouth daily       TRAZODONE HCL PO      Take 50 mg by mouth At Bedtime       Valerian 500 MG Caps      Take by mouth At Bedtime       VITAMIN D3 PO      Take 2,000 Units by mouth daily       ZANTAC PO      Take 150 mg by mouth as needed Reported on 5/22/2017

## 2017-05-22 NOTE — LETTER
5/22/2017       RE: Radhika Williamson  [address]  [place] [address]     Dear Colleague,    Thank you for referring your patient, Radhika Williamson, to the [facility]. Please see a copy of my visit note below.    HISTORY OF PRESENT ILLNESS:  Follow up breast cancer     Radhika is a 77-year-old, otherwise healthy female who originally underwent a screening mammography in 12/2015.  This showed an abnormality at the 3 o'clock position and biopsy was recommended.  Biopsy was performed at [facility] on 12/17/2015 showing invasive ductal carcinoma, Randy grade 2.  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  Estrogen was high positivity, progesterone receptor high positivity, and HER2/phuong was negative with a score of 1+.  The patient was away for 6 weeks and then came in for consultation and evaluation by Dr. Luke Salmon on 01/29/2016, at which time they discussed breast conservation.  She had had an MRI at [facility] showing a normal right breast and a 1.6-cm mass at the 3 o'clock position in the left breast.  There were no suspicious lymph nodes.  She went on to have a lumpectomy and sentinel lymph node biopsy of the left breast on 02/24/2016.  The pathology revealed invasive ductal carcinoma, Randy grade 3, measuring 1.7 x 1.7 x 1.6 cm.  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  The surgical margins were negative for invasive cancer or for ductal carcinoma in situ.  This was estrogen receptor positive 90%, progesterone receptor positive 70%,.  HER2 testing was performed on the tumor.  The HER2 testing was performed by FISH revealing HER2 signals per cell of 4.2 which is considered equivocal, a HER2:CEN17 ratio of 1.2 is equivocal, and subsequent probe HER2:SMS ratio of 1.3.  Zero out of 1 lymph node had any evidence of disease, giving her final staging of a T1c N0 left breast cancer.  She did have an Oncotype sent off revealing a score of 19, giving her  risk of distant recurrence assuming she takes tamoxifen for 5 years of 12% with tamoxifen alone.      She completed adjuvant radiation under the care of Dr Grayson on 5/6/2016.  She started on daily arimedex on 5/7/2016.     INTERVAL HISTORY:  Radhika is here for followup.  She reports generally feeling well.  She is taking Arimidex with no side effects.  She completed cancer rehab last month.  She is very pleased with the results.  She did develop some left shoulder discomfort following the rehab, and this has been stable.  She has some pain when she is trying to abduct her shoulder.  She did not have any issues before.  She does not take any pain medication in this regard.  Otherwise, she was feeling quite well.  Her energy level is good.  She is exercising.  She is eating well.  She denies any fatigue.  No new bone pain, headaches, respiratory or GI symptoms, changes in her bowel or bladder habits.  She did not feel any masses on self breast exam.           ROS:    10-point review of systems is otherwise negative.      PAST MEDICAL HISTORY:  Stage I breast cancer as discussed in the HPI.       MEDICATIONS:  Reviewed     ALLERGIES:  No known drug allergies.      SOCIAL HISTORY:  She is .  She has no tobacco use or alcohol use.  She and her  like to travel frequently.  She has a daughter who is age 34 and no grandchildren.      GYNECOLOGIC HISTORY:  She went through menopause in her late 40s.  She was on hormone replacement therapy for several years.  She has been off of this for approximately 15-20 years at this time.  She is not sure of the exact date.  She has never had any abnormal mammograms previously.  She has not needed any breast biopsies in the past.       FAMILY HISTORY:  She has a maternal aunt who had breast cancer, she believes in her 40s.  Her aunt is still living at the age of 96.       PHYSICAL EXAMINATION:   /68 (BP Location: Right arm, Patient Position: Chair, Cuff Size: Adult  "Regular)  Pulse 68  Temp 98  F (36.7  C) (Oral)  Resp 16  Ht 1.785 m (5' 10.28\")  Wt 68.2 kg (150 lb 4.8 oz)  SpO2 97%  BMI 21.4 kg/m2  GENERAL:  well-appearing, NAD  HEENT: no icterus; TRIXIE; EOMI;  moist oral mucosa,  no ulcers or lesions.   NECK:  Supple.  There is no cervical, supraclavicular or axillary adenopathy.   HEART:  Regular rate and rhythm, no mumurs  LUNGS:  Clear bilaterally, non-labored breathing  ABDOMEN:  Soft, nontender, non-distended;no palpable hepatosplenomegaly.     BREASTS:  Her breast examination reveals well healed scar in the left breast at the 3 o'clock position post-lumpectomy, no masses appreciated. Right breast exam is unremarkable. No axillary LA.  NEUROLOGIC:  Unremarkable.       LABORATORY DATA:      Mammogram 11/28/16: negative     ASSESSMENT AND PLAN:      This is a 77-year-old female with a T1c N0 left breast cancer of invasive ductal carcinoma, ER positive, IL positive, HER2 negative ( equivocal by FISH, and 1+ by IHC).   Oncotype  score of 19 giving her risk of distant recurrence assuming she takes tamoxifen for 5 years of 12% with tamoxifen alone.      1.  Breast cancer. She is on adjuvant endocrine therapy alone.  She was not interested in chemotherapy.   She completed adjuvant radiation under the care of Dr Grayson on 5/6/2016.   Patient tolerates Arimidex really well. There is no evidence of recurrence on today's exam.  Her next mammogram is due in Fall 2017.    She is taking daily Vit D and Calcium and is doing weight bearing exercise twice a week.          2. Insomnia- this is resolved with behavioral therapy and she is off all insomnia meds after 40 years of medication therapy.    3. Bone health - will continue to monitor bones while on AI. Her last DEXA scan was done at [facility] on 5/5/17, she had normal bone density.    RTC in 3 months but she knows to call us with any questions in the interim    All of her questions were answered to her satisfaction    Pt seen " and d/w Dr. Long Horta MD  Hematology, Oncology Fellow      Pt was seen and evaluated by me.  Pt is doing well with no signs of recurrence.  Arimidex to continue.  Mammogram in the fall.    Cindy Alves MD

## 2017-05-22 NOTE — NURSING NOTE
"Oncology Rooming Note    May 22, 2017 9:12 AM   Radhika Williamson is a 77 year old female who presents for:    Chief Complaint   Patient presents with     Oncology Clinic Visit     Breast Ca- F/U     Initial Vitals: /68 (BP Location: Right arm, Patient Position: Chair, Cuff Size: Adult Regular)  Pulse 68  Temp 98  F (36.7  C) (Oral)  Resp 16  Ht 1.785 m (5' 10.28\")  Wt 68.2 kg (150 lb 4.8 oz)  SpO2 97%  BMI 21.4 kg/m2 Estimated body mass index is 21.4 kg/(m^2) as calculated from the following:    Height as of this encounter: 1.785 m (5' 10.28\").    Weight as of this encounter: 68.2 kg (150 lb 4.8 oz). Body surface area is 1.84 meters squared.  No Pain (0) Comment: Data Unavailable   No LMP recorded. Patient is postmenopausal.  Allergies reviewed: Yes  Medications reviewed: Yes    Medications: MEDICATION REFILLS NEEDED TODAY. Provider was notified.  Pharmacy name entered into EPIC: Data Unavailable    Clinical concerns: Patient need refills on Anastrozole and Fluocinonite solution   provider was notified.    7 minutes for nursing intake (face to face time)     Bhavya Jean CMA              "

## 2017-05-22 NOTE — PROGRESS NOTES
HISTORY OF PRESENT ILLNESS:  Follow up breast cancer     Radhika is a 77-year-old, otherwise healthy female who originally underwent a screening mammography in 12/2015.  This showed an abnormality at the 3 o'clock position and biopsy was recommended.  Biopsy was performed at [facility] on 12/17/2015 showing invasive ductal carcinoma, [place] grade 2.  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  Estrogen was high positivity, progesterone receptor high positivity, and HER2/phuong was negative with a score of 1+.  The patient was away for 6 weeks and then came in for consultation and evaluation by Dr. Luke Salmon on 01/29/2016, at which time they discussed breast conservation.  She had had an MRI at [facility] showing a normal right breast and a 1.6-cm mass at the 3 o'clock position in the left breast.  There were no suspicious lymph nodes.  She went on to have a lumpectomy and sentinel lymph node biopsy of the left breast on 02/24/2016.  The pathology revealed invasive ductal carcinoma, [place] grade 3, measuring 1.7 x 1.7 x 1.6 cm.  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  The surgical margins were negative for invasive cancer or for ductal carcinoma in situ.  This was estrogen receptor positive 90%, progesterone receptor positive 70%,.  HER2 testing was performed on the tumor.  The HER2 testing was performed by FISH revealing HER2 signals per cell of 4.2 which is considered equivocal, a HER2:CEN17 ratio of 1.2 is equivocal, and subsequent probe HER2:SMS ratio of 1.3.  Zero out of 1 lymph node had any evidence of disease, giving her final staging of a T1c N0 left breast cancer.  She did have an Oncotype sent off revealing a score of 19, giving her risk of distant recurrence assuming she takes tamoxifen for 5 years of 12% with tamoxifen alone.      She completed adjuvant radiation under the care of Dr Grayson on 5/6/2016.  She started on daily arimedex on 5/7/2016.     INTERVAL  "HISTORY:  Radhika is here for followup.  She reports generally feeling well.  She is taking Arimidex with no side effects.  She completed cancer rehab last month.  She is very pleased with the results.  She did develop some left shoulder discomfort following the rehab, and this has been stable.  She has some pain when she is trying to abduct her shoulder.  She did not have any issues before.  She does not take any pain medication in this regard.  Otherwise, she was feeling quite well.  Her energy level is good.  She is exercising.  She is eating well.  She denies any fatigue.  No new bone pain, headaches, respiratory or GI symptoms, changes in her bowel or bladder habits.  She did not feel any masses on self breast exam.           ROS:    10-point review of systems is otherwise negative.      PAST MEDICAL HISTORY:  Stage I breast cancer as discussed in the HPI.       MEDICATIONS:  Reviewed     ALLERGIES:  No known drug allergies.      SOCIAL HISTORY:  She is .  She has no tobacco use or alcohol use.  She and her  like to travel frequently.  She has a daughter who is age 34 and no grandchildren.      GYNECOLOGIC HISTORY:  She went through menopause in her late 40s.  She was on hormone replacement therapy for several years.  She has been off of this for approximately 15-20 years at this time.  She is not sure of the exact date.  She has never had any abnormal mammograms previously.  She has not needed any breast biopsies in the past.       FAMILY HISTORY:  She has a maternal aunt who had breast cancer, she believes in her 40s.  Her aunt is still living at the age of 96.       PHYSICAL EXAMINATION:   /68 (BP Location: Right arm, Patient Position: Chair, Cuff Size: Adult Regular)  Pulse 68  Temp 98  F (36.7  C) (Oral)  Resp 16  Ht 1.785 m (5' 10.28\")  Wt 68.2 kg (150 lb 4.8 oz)  SpO2 97%  BMI 21.4 kg/m2  GENERAL:  well-appearing, NAD  HEENT: no icterus; TRIXIE; EOMI;  moist oral mucosa,  no " ulcers or lesions.   NECK:  Supple.  There is no cervical, supraclavicular or axillary adenopathy.   HEART:  Regular rate and rhythm, no mumurs  LUNGS:  Clear bilaterally, non-labored breathing  ABDOMEN:  Soft, nontender, non-distended;no palpable hepatosplenomegaly.     BREASTS:  Her breast examination reveals well healed scar in the left breast at the 3 o'clock position post-lumpectomy, no masses appreciated. Right breast exam is unremarkable. No axillary LA.  NEUROLOGIC:  Unremarkable.       LABORATORY DATA:      Mammogram 11/28/16: negative     ASSESSMENT AND PLAN:      This is a 77-year-old female with a T1c N0 left breast cancer of invasive ductal carcinoma, ER positive, NC positive, HER2 negative ( equivocal by FISH, and 1+ by IHC).   Oncotype  score of 19 giving her risk of distant recurrence assuming she takes tamoxifen for 5 years of 12% with tamoxifen alone.      1.  Breast cancer. She is on adjuvant endocrine therapy alone.  She was not interested in chemotherapy.   She completed adjuvant radiation under the care of Dr Grayson on 5/6/2016.   Patient tolerates Arimidex really well. There is no evidence of recurrence on today's exam.  Her next mammogram is due in Fall 2017.    She is taking daily Vit D and Calcium and is doing weight bearing exercise twice a week.          2. Insomnia- this is resolved with behavioral therapy and she is off all insomnia meds after 40 years of medication therapy.    3. Bone health - will continue to monitor bones while on AI. Her last DEXA scan was done at [facility] on 5/5/17, she had normal bone density.    RTC in 3 months but she knows to call us with any questions in the interim    All of her questions were answered to her satisfaction    Pt seen and d/w Dr. Long Horta MD  Hematology, Oncology Fellow      Pt was seen and evaluated by me.  Pt is doing well with no signs of recurrence.  Arimidex to continue.  Mammogram in the fall.    Cindy Alves MD

## 2017-08-15 DIAGNOSIS — Z85.3 HISTORY OF LEFT BREAST CANCER: Primary | ICD-10-CM

## 2017-11-22 ASSESSMENT — ENCOUNTER SYMPTOMS
HEMOPTYSIS: 0
COUGH DISTURBING SLEEP: 0
JAUNDICE: 0
WEIGHT LOSS: 0
HEARTBURN: 1
FEVER: 1
FATIGUE: 1
CHILLS: 0
WEIGHT GAIN: 0
DYSPNEA ON EXERTION: 0
NIGHT SWEATS: 0
BOWEL INCONTINENCE: 0
SMELL DISTURBANCE: 0
DECREASED APPETITE: 0
HOARSE VOICE: 0
SORE THROAT: 0
POSTURAL DYSPNEA: 1
ALTERED TEMPERATURE REGULATION: 0
COUGH: 0
ABDOMINAL PAIN: 0
SINUS PAIN: 0
SPUTUM PRODUCTION: 1
SKIN CHANGES: 1
INCREASED ENERGY: 0
NAUSEA: 0
TROUBLE SWALLOWING: 1
TASTE DISTURBANCE: 0
SHORTNESS OF BREATH: 1
POLYDIPSIA: 0
SNORES LOUDLY: 0
BLOOD IN STOOL: 0
VOMITING: 0
WHEEZING: 0
HALLUCINATIONS: 0
SINUS CONGESTION: 1
POLYPHAGIA: 0
CONSTIPATION: 0
RECTAL PAIN: 0
NAIL CHANGES: 0
NECK MASS: 0
POOR WOUND HEALING: 0
DIARRHEA: 0
BLOATING: 0

## 2017-11-27 ENCOUNTER — ONCOLOGY VISIT (OUTPATIENT)
Dept: ONCOLOGY | Facility: CLINIC | Age: 77
End: 2017-11-27
Attending: INTERNAL MEDICINE
Payer: COMMERCIAL

## 2017-11-27 VITALS
WEIGHT: 157.6 LBS | OXYGEN SATURATION: 98 % | SYSTOLIC BLOOD PRESSURE: 119 MMHG | HEART RATE: 68 BPM | HEIGHT: 68 IN | TEMPERATURE: 97.2 F | BODY MASS INDEX: 23.89 KG/M2 | DIASTOLIC BLOOD PRESSURE: 68 MMHG

## 2017-11-27 DIAGNOSIS — Z17.0 MALIGNANT NEOPLASM OF LEFT BREAST IN FEMALE, ESTROGEN RECEPTOR POSITIVE, UNSPECIFIED SITE OF BREAST (H): Primary | ICD-10-CM

## 2017-11-27 DIAGNOSIS — C50.912 MALIGNANT NEOPLASM OF LEFT BREAST IN FEMALE, ESTROGEN RECEPTOR POSITIVE, UNSPECIFIED SITE OF BREAST (H): Primary | ICD-10-CM

## 2017-11-27 PROCEDURE — 99214 OFFICE O/P EST MOD 30 MIN: CPT | Mod: ZP | Performed by: INTERNAL MEDICINE

## 2017-11-27 PROCEDURE — 99212 OFFICE O/P EST SF 10 MIN: CPT | Mod: ZF

## 2017-11-27 RX ORDER — HYDROCORTISONE 2.5 %
CREAM (GRAM) TOPICAL
COMMUNITY
Start: 2017-11-20

## 2017-11-27 ASSESSMENT — PAIN SCALES - GENERAL: PAINLEVEL: NO PAIN (0)

## 2017-11-27 NOTE — NURSING NOTE
"Oncology Rooming Note    November 27, 2017 10:06 AM   Radhika Williamson is a 77 year old female who presents for:    Chief Complaint   Patient presents with     Oncology Clinic Visit     Follow up-Breast CA     Initial Vitals: /68 (BP Location: Right arm, Patient Position: Sitting, Cuff Size: Adult Regular)  Pulse 68  Temp 97.2  F (36.2  C) (Oral)  Ht 1.727 m (5' 8\")  Wt 71.5 kg (157 lb 9.6 oz)  SpO2 98%  BMI 23.96 kg/m2 Estimated body mass index is 23.96 kg/(m^2) as calculated from the following:    Height as of this encounter: 1.727 m (5' 8\").    Weight as of this encounter: 71.5 kg (157 lb 9.6 oz). Body surface area is 1.85 meters squared.  No Pain (0) Comment: Data Unavailable   No LMP recorded. Patient is postmenopausal.  Allergies reviewed: Yes  Medications reviewed: Yes    Medications: MEDICATION REFILLS NEEDED TODAY. Provider was notified.  Pharmacy name entered into EPIC: Data Unavailable    Clinical concerns: Refill on Anastrozole Dr. Alves was notified.    10 minutes for nursing intake (face to face time)     Salma Harding LPN              "

## 2017-11-27 NOTE — MR AVS SNAPSHOT
After Visit Summary   11/27/2017    Radhika Williamson    MRN: [id number]           Patient Information     Date Of Birth          1940        Visit Information        Provider Department      11/27/2017 10:00 AM Cindy Alves MD [facility]         Follow-ups after your visit        Your next 10 appointments already scheduled     May 25, 2018 10:30 AM CDT   (Arrive by 10:15 AM)   Return Visit with Cindy Alves MD   [facility] ([facility])    [address] [place] [address]  [address] [facility] [address] [phone]              Who to contact     If you have questions or need follow up information about today's clinic visit or your schedule please contact [facility] directly at [phone].  Normal or non-critical lab and imaging results will be communicated to you by MyChart, letter or phone within 4 business days after the clinic has received the results. If you do not hear from us within 7 days, please contact the clinic through MyChart or phone. If you have a critical or abnormal lab result, we will notify you by phone as soon as possible.  Submit refill requests through [facility] or call your pharmacy and they will forward the refill request to us. Please allow 3 business days for your refill to be completed.          Additional Information About Your Visit        MyChart Information     [facility] gives you secure access to your electronic health record. If you see a primary care provider, you can also send messages to your care team and make appointments. If you have questions, please call your primary care clinic.  If you do not have a primary care provider, please call [phone] and they will assist you.        Care EveryWhere ID     This is your Care EveryWhere ID. This could be used by other organizations to access your [facility] medical records  [id number]        Your Vitals Were     Pulse  "Temperature Height Pulse Oximetry BMI (Body Mass Index)       68 97.2  F (36.2  C) (Oral) 1.727 m (5' 8\") 98% 23.96 kg/m2        Blood Pressure from Last 3 Encounters:   11/27/17 119/68   05/22/17 132/68   02/27/17 118/74    Weight from Last 3 Encounters:   11/27/17 71.5 kg (157 lb 9.6 oz)   05/22/17 68.2 kg (150 lb 4.8 oz)   02/27/17 67.5 kg (148 lb 12.8 oz)              Today, you had the following     No orders found for display         Today's Medication Changes          These changes are accurate as of: 11/27/17 10:48 AM.  If you have any questions, ask your nurse or doctor.               Stop taking these medicines if you haven't already. Please contact your care team if you have questions.     calcium carbonate 600 MG tablet   Commonly known as:  OS-WILLARD 600 mg [place] IRA. Ca   Stopped by:  Cindy Alves MD                    Primary Care Provider Office Phone # Fax #    Mckenna L Samantha Batresaham [phone]       [place] [address]        Equal Access to Services     MALINDA CASTORENA AH: Marcus hale Soeva, waotonielda juan jose, qaybta kaalmada gamaliel, davida lo. So [facility] [phone].    ATENCIÓN: Si habla español, tiene a parada disposición servicios gratuitos de asistencia lingüística. Llame al [phone].    We comply with applicable federal civil rights laws and [place] laws. We do not discriminate on the basis of race, color, national origin, age, disability, sex, sexual orientation, or gender identity.            Thank you!     Thank you for choosing [facility]  for your care. Our goal is always to provide you with excellent care. Hearing back from our patients is one way we can continue to improve our services. Please take a few minutes to complete the written survey that you may receive in the mail after your visit with us. Thank you!             Your Updated Medication List - Protect others around " you: Learn how to safely use, store and throw away your medicines at www.disposemymeds.org.          This list is accurate as of: 11/27/17 10:48 AM.  Always use your most recent med list.                   Brand Name Dispense Instructions for use Diagnosis    anastrozole 1 MG tablet    ARIMIDEX    90 tablet    Take 1 tablet (1 mg) by mouth daily    Malignant neoplasm of lower-outer quadrant of left female breast (H)       calcium citrate-vitamin D 315-250 MG-UNIT Tabs per tablet    CITRACAL     Take 2 tablets by mouth daily 500 mg daily        clobetasol 0.05 % ointment    TEMOVATE     Apply to affected areas on arms and legs twice a day for up to two weeks at a time as needed for severe flares.        desonide 0.05 % cream    DESOWEN     Apply to affected areas on ears and groin twice a day as needed.        fluocinonide 0.05 % solution    LIDEX     apply to scalp twice daily as needed        hydrocortisone 2.5 % cream      Apply a thin layer to affected skin twice a day for up to  3-4 weeks face only 1 week        IMMUNE ENHANCE PO      daily        KRILL OIL PO      Take 500 mg by mouth daily        MELATONIN PO      Take 3 mg by mouth nightly as needed        multivitamin, therapeutic with minerals Tabs tablet      Take 1 tablet by mouth daily        PROBIOTIC DAILY Caps      Take 2 capsules by mouth daily        TRAZODONE HCL PO      Take 50 mg by mouth At Bedtime        Valerian 500 MG Caps      Take by mouth At Bedtime        VITAMIN D3 PO      Take 2,000 Units by mouth daily        ZANTAC PO      Take 150 mg by mouth as needed Reported on 5/22/2017

## 2017-11-27 NOTE — PROGRESS NOTES
November 27, 2017    Follow up breast cancer     Radhika is a 77-year-old, otherwise healthy female who originally underwent a screening mammography in 12/2015.  This showed an abnormality at the 3 o'clock position and biopsy was recommended.  Biopsy was performed at [facility] on 12/17/2015 showing invasive ductal carcinoma, [place] grade 2.  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  Estrogen was high positivity, progesterone receptor high positivity, and HER2/phuong was negative with a score of 1+.  The patient was away for 6 weeks and then came in for consultation and evaluation by Dr. Luke Salmon on 01/29/2016, at which time they discussed breast conservation.  She had had an MRI at [facility] showing a normal right breast and a 1.6-cm mass at the 3 o'clock position in the left breast.  There were no suspicious lymph nodes.  She went on to have a lumpectomy and sentinel lymph node biopsy of the left breast on 02/24/2016.  The pathology revealed invasive ductal carcinoma, [place] grade 3, measuring 1.7 x 1.7 x 1.6 cm.  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  The surgical margins were negative for invasive cancer or for ductal carcinoma in situ.  This was estrogen receptor positive 90%, progesterone receptor positive 70%,.  HER2 testing was performed on the tumor.  The HER2 testing was performed by FISH revealing HER2 signals per cell of 4.2 which is considered equivocal, a HER2:CEN17 ratio of 1.2 is equivocal, and subsequent probe HER2:SMS ratio of 1.3.  Zero out of 1 lymph node had any evidence of disease, giving her final staging of a T1c N0 left breast cancer.  She did have an Oncotype sent off revealing a score of 19, giving her risk of distant recurrence assuming she takes tamoxifen for 5 years of 12% with tamoxifen alone.      She completed adjuvant radiation under the care of Dr Grayson on 5/6/2016.  She started on daily arimedex on 5/7/2016.     INTERVAL HISTORY:   "Radhika is here for followup. In discussions with Radhika, she is feeling well.  She is taking Arimidex daily and notices very few side effects.  She has rare hot flashes.  She has no issues with arthralgias or myalgias.  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  She is working with physical therapy to improve this.       She has no new medical history.  She underwent a screening mammogram today that was unremarkable.       She has no problems with lymphedema.       She has no nodules or masses.  She is eating and drinking well.  Her 10-point review of systems is otherwise negative.          PAST MEDICAL HISTORY:  Stage I breast cancer as discussed in the HPI.       MEDICATIONS:  Reviewed     ALLERGIES:  No known drug allergies.      SOCIAL HISTORY:  She is .  She has no tobacco use or alcohol use.  She and her  like to travel frequently.  She has a daughter who is age 34 and no grandchildren.      GYNECOLOGIC HISTORY:  She went through menopause in her late 40s.  She was on hormone replacement therapy for several years.  She has been off of this for approximately 15-20 years at this time.  She is not sure of the exact date.  She has never had any abnormal mammograms previously.  She has not needed any breast biopsies in the past.       FAMILY HISTORY:  She has a maternal aunt who had breast cancer, she believes in her 40s.  Her aunt is still living at the age of 96.       PHYSICAL EXAMINATION:   /68 (BP Location: Right arm, Patient Position: Sitting, Cuff Size: Adult Regular)  Pulse 68  Temp 97.2  F (36.2  C) (Oral)  Ht 1.727 m (5' 8\")  Wt 71.5 kg (157 lb 9.6 oz)  SpO2 98%  BMI 23.96 kg/m2  GENERAL:  well-appearing, NAD  HEENT: no icterus; TRIXIE; EOMI;  moist oral mucosa,  no ulcers or lesions.   NECK:  Supple.  There is no cervical, supraclavicular or axillary adenopathy.   HEART:  Regular rate and rhythm, no mumurs  LUNGS:  Clear bilaterally, " non-labored breathing  ABDOMEN:  Soft, nontender, non-distended;no palpable hepatosplenomegaly.     BREASTS:  Her breast examination reveals well healed scar in the left breast at the 3 o'clock position post-lumpectomy, no masses appreciated. Right breast exam is unremarkable. No axillary LA.  NEUROLOGIC:  Unremarkable.       LABORATORY DATA:      Mammogram 11/27/2017: benign by preliminary report     ASSESSMENT AND PLAN:      This is a 77-year-old female with a T1c N0 left breast cancer of invasive ductal carcinoma, ER positive, [place] positive, HER2 negative ( equivocal by FISH, and 1+ by IHC).   Oncotype  score of 19 giving her risk of distant recurrence assuming she takes tamoxifen for 5 years of 12% with tamoxifen alone.      1.  Breast cancer. She is on adjuvant endocrine therapy alone.  She was not interested in chemotherapy.   She completed adjuvant radiation under the care of Dr Grayson on 5/6/2016. Patient tolerates Arimidex really well. There is no evidence of recurrence on today's exam.    Her next mammogram is due in Fall 2018.  We discussed an appt in 6 months with me and then imaging in 12 months.    She is taking daily Vit D and Calcium and is doing weight bearing exercise twice a week.        2. Insomnia- this is resolved with behavioral therapy and she is off all insomnia meds      3. Bone health - will continue to monitor bones while on AI. Her last DEXA scan was done at [facility] on 5/5/17, she had normal bone density.    RTC in 6 months but she knows to call us with any questions in the interim        Cindy Alves MD

## 2017-11-27 NOTE — LETTER
11/27/2017       RE: Radhika Williamson  [address]  [place] [place] [facility] [address]     Dear Colleague,    Thank you for referring your patient, Radhika Williamson, to the [facility]. Please see a copy of my visit note below.    November 27, 2017    Follow up breast cancer     Radhika is a 77-year-old, otherwise healthy female who originally underwent a screening mammography in 12/2015.  This showed an abnormality at the 3 o'clock position and biopsy was recommended.  Biopsy was performed at [facility] on 12/17/2015 showing invasive ductal carcinoma, Randy grade 2.  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  Estrogen was high positivity, progesterone receptor high positivity, and HER2/phuong was negative with a score of 1+.  The patient was away for 6 weeks and then came in for consultation and evaluation by Dr. Luke Salmon on 01/29/2016, at which time they discussed breast conservation.  She had had an MRI at [facility] showing a normal right breast and a 1.6-cm mass at the 3 o'clock position in the left breast.  There were no suspicious lymph nodes.  She went on to have a lumpectomy and sentinel lymph node biopsy of the left breast on 02/24/2016.  The pathology revealed invasive ductal carcinoma, Randy grade 3, measuring 1.7 x 1.7 x 1.6 cm.  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  The surgical margins were negative for invasive cancer or for ductal carcinoma in situ.  This was estrogen receptor positive 90%, progesterone receptor positive 70%,.  HER2 testing was performed on the tumor.  The HER2 testing was performed by FISH revealing HER2 signals per cell of 4.2 which is considered equivocal, a HER2:CEN17 ratio of 1.2 is equivocal, and subsequent probe HER2:SMS ratio of 1.3.  Zero out of 1 lymph node had any evidence of disease, giving her final staging of a T1c N0 left breast cancer.  She did have an Oncotype sent off revealing a score of 19, giving her risk of  "distant recurrence assuming she takes tamoxifen for 5 years of 12% with tamoxifen alone.      She completed adjuvant radiation under the care of Dr Grayson on 5/6/2016.  She started on daily arimedex on 5/7/2016.     INTERVAL HISTORY:  Radhika is here for followup. In discussions with Radhika, she is feeling well.  She is taking Arimidex daily and notices very few side effects.  She has rare hot flashes.  She has no issues with arthralgias or myalgias.  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  She is working with physical therapy to improve this.       She has no new medical history.  She underwent a screening mammogram today that was unremarkable.       She has no problems with lymphedema.       She has no nodules or masses.  She is eating and drinking well.  Her 10-point review of systems is otherwise negative.          PAST MEDICAL HISTORY:  Stage I breast cancer as discussed in the HPI.       MEDICATIONS:  Reviewed     ALLERGIES:  No known drug allergies.      SOCIAL HISTORY:  She is .  She has no tobacco use or alcohol use.  She and her  like to travel frequently.  She has a daughter who is age 34 and no grandchildren.      GYNECOLOGIC HISTORY:  She went through menopause in her late 40s.  She was on hormone replacement therapy for several years.  She has been off of this for approximately 15-20 years at this time.  She is not sure of the exact date.  She has never had any abnormal mammograms previously.  She has not needed any breast biopsies in the past.       FAMILY HISTORY:  She has a maternal aunt who had breast cancer, she believes in her 40s.  Her aunt is still living at the age of 96.       PHYSICAL EXAMINATION:   /68 (BP Location: Right arm, Patient Position: Sitting, Cuff Size: Adult Regular)  Pulse 68  Temp 97.2  F (36.2  C) (Oral)  Ht 1.727 m (5' 8\")  Wt 71.5 kg (157 lb 9.6 oz)  SpO2 98%  BMI 23.96 kg/m2  GENERAL:  well-appearing, " NAD  HEENT: no icterus; TRIXIE; EOMI;  moist oral mucosa,  no ulcers or lesions.   NECK:  Supple.  There is no cervical, supraclavicular or axillary adenopathy.   HEART:  Regular rate and rhythm, no mumurs  LUNGS:  Clear bilaterally, non-labored breathing  ABDOMEN:  Soft, nontender, non-distended;no palpable hepatosplenomegaly.     BREASTS:  Her breast examination reveals well healed scar in the left breast at the 3 o'clock position post-lumpectomy, no masses appreciated. Right breast exam is unremarkable. No axillary LA.  NEUROLOGIC:  Unremarkable.       LABORATORY DATA:      Mammogram 11/27/2017: benign by preliminary report     ASSESSMENT AND PLAN:      This is a 77-year-old female with a T1c N0 left breast cancer of invasive ductal carcinoma, ER positive, MA positive, HER2 negative ( equivocal by FISH, and 1+ by IHC).   Oncotype  score of 19 giving her risk of distant recurrence assuming she takes tamoxifen for 5 years of 12% with tamoxifen alone.      1.  Breast cancer. She is on adjuvant endocrine therapy alone.  She was not interested in chemotherapy.   She completed adjuvant radiation under the care of Dr Grayson on 5/6/2016. Patient tolerates Arimidex really well. There is no evidence of recurrence on today's exam.    Her next mammogram is due in Fall 2018.  We discussed an appt in 6 months with me and then imaging in 12 months.    She is taking daily Vit D and Calcium and is doing weight bearing exercise twice a week.        2. Insomnia- this is resolved with behavioral therapy and she is off all insomnia meds      3. Bone health - will continue to monitor bones while on AI. Her last DEXA scan was done at [facility] on 5/5/17, she had normal bone density.    RTC in 6 months but she knows to call us with any questions in the interim        Cindy Alves MD

## 2018-05-21 ASSESSMENT — ENCOUNTER SYMPTOMS
EYE PAIN: 0
RECTAL PAIN: 0
SINUS CONGESTION: 1
ABDOMINAL PAIN: 0
BOWEL INCONTINENCE: 0
SORE THROAT: 0
NAIL CHANGES: 0
JAUNDICE: 0
COUGH: 1
BLOATING: 0
TASTE DISTURBANCE: 0
POSTURAL DYSPNEA: 0
SKIN CHANGES: 0
SNORES LOUDLY: 0
VOMITING: 0
HEARTBURN: 1
EYE IRRITATION: 1
POOR WOUND HEALING: 0
SHORTNESS OF BREATH: 0
HEMOPTYSIS: 0
SMELL DISTURBANCE: 0
DIARRHEA: 0
COUGH DISTURBING SLEEP: 1
HOARSE VOICE: 0
DOUBLE VISION: 0
NAUSEA: 0
CONSTIPATION: 1
NECK MASS: 0
SPUTUM PRODUCTION: 1
DYSPNEA ON EXERTION: 0
TROUBLE SWALLOWING: 0
BLOOD IN STOOL: 0
WHEEZING: 0
EYE REDNESS: 0
SINUS PAIN: 0

## 2018-05-25 ENCOUNTER — ONCOLOGY VISIT (OUTPATIENT)
Dept: ONCOLOGY | Facility: CLINIC | Age: 78
End: 2018-05-25
Attending: INTERNAL MEDICINE
Payer: COMMERCIAL

## 2018-05-25 VITALS
BODY MASS INDEX: 24.63 KG/M2 | HEART RATE: 70 BPM | HEIGHT: 68 IN | TEMPERATURE: 97.8 F | WEIGHT: 162.5 LBS | OXYGEN SATURATION: 98 % | DIASTOLIC BLOOD PRESSURE: 68 MMHG | RESPIRATION RATE: 18 BRPM | SYSTOLIC BLOOD PRESSURE: 108 MMHG

## 2018-05-25 DIAGNOSIS — C50.512 MALIGNANT NEOPLASM OF LOWER-OUTER QUADRANT OF LEFT BREAST OF FEMALE, ESTROGEN RECEPTOR POSITIVE (H): ICD-10-CM

## 2018-05-25 DIAGNOSIS — Z17.0 MALIGNANT NEOPLASM OF LOWER-OUTER QUADRANT OF LEFT BREAST OF FEMALE, ESTROGEN RECEPTOR POSITIVE (H): ICD-10-CM

## 2018-05-25 DIAGNOSIS — Z12.31 ENCOUNTER FOR SCREENING MAMMOGRAM FOR MALIGNANT NEOPLASM OF BREAST: ICD-10-CM

## 2018-05-25 PROCEDURE — G0463 HOSPITAL OUTPT CLINIC VISIT: HCPCS | Mod: ZF

## 2018-05-25 PROCEDURE — 99214 OFFICE O/P EST MOD 30 MIN: CPT | Mod: ZP | Performed by: INTERNAL MEDICINE

## 2018-05-25 RX ORDER — TRIAMCINOLONE ACETONIDE 1 MG/G
OINTMENT TOPICAL
COMMUNITY
Start: 2017-12-11 | End: 2021-06-07

## 2018-05-25 RX ORDER — ANASTROZOLE 1 MG/1
1 TABLET ORAL DAILY
Qty: 90 TABLET | Refills: 3 | Status: SHIPPED | OUTPATIENT
Start: 2018-05-25 | End: 2019-06-03

## 2018-05-25 RX ORDER — BETAMETHASONE DIPROPIONATE 0.5 MG/ML
LOTION, AUGMENTED TOPICAL
COMMUNITY
Start: 2018-03-22 | End: 2019-06-03

## 2018-05-25 ASSESSMENT — PAIN SCALES - GENERAL: PAINLEVEL: NO PAIN (0)

## 2018-05-25 NOTE — NURSING NOTE
"Oncology Rooming Note    May 25, 2018 10:25 AM   Radhika Williamson is a 78 year old female who presents for:    Chief Complaint   Patient presents with     Oncology Clinic Visit     6 month f/u Breast Ca     Initial Vitals: /68 (BP Location: Right arm, Patient Position: Sitting, Cuff Size: Adult Regular)  Pulse 70  Temp 97.8  F (36.6  C) (Oral)  Resp 18  Ht 1.727 m (5' 7.99\")  Wt 73.7 kg (162 lb 8 oz)  SpO2 98%  BMI 24.71 kg/m2 Estimated body mass index is 24.71 kg/(m^2) as calculated from the following:    Height as of this encounter: 1.727 m (5' 7.99\").    Weight as of this encounter: 73.7 kg (162 lb 8 oz). Body surface area is 1.88 meters squared.  No Pain (0) Comment: Data Unavailable   No LMP recorded. Patient is postmenopausal.  Allergies reviewed: Yes  Medications reviewed: Yes    Medications: MEDICATION REFILLS NEEDED TODAY. Provider was notified.  Pharmacy name entered into EPIC: Data Unavailable    Clinical concerns: Refill needed. Provider was notified.    10 minutes for nursing intake (face to face time)     Cindy Sarabia LPN            "

## 2018-05-25 NOTE — PROGRESS NOTES
May 25, 2018    Follow up breast cancer     Radhika is a 77-year-old, otherwise healthy female who originally underwent a screening mammography in 12/2015.  This showed an abnormality at the 3 o'clock position and biopsy was recommended.  Biopsy was performed at [facility] on 12/17/2015 showing invasive ductal carcinoma, Randy grade 2.  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  Estrogen was high positivity, progesterone receptor high positivity, and HER2/phuong was negative with a score of 1+.  The patient was away for 6 weeks and then came in for consultation and evaluation by Dr. Luke Salmon on 01/29/2016, at which time they discussed breast conservation.  She had had an MRI at [facility] showing a normal right breast and a 1.6-cm mass at the 3 o'clock position in the left breast.  There were no suspicious lymph nodes.  She went on to have a lumpectomy and sentinel lymph node biopsy of the left breast on 02/24/2016.  The pathology revealed invasive ductal carcinoma, [place] grade 3, measuring 1.7 x 1.7 x 1.6 cm.  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  The surgical margins were negative for invasive cancer or for ductal carcinoma in situ.  This was estrogen receptor positive 90%, progesterone receptor positive 70%,.  HER2 testing was performed on the tumor.  The HER2 testing was performed by FISH revealing HER2 signals per cell of 4.2 which is considered equivocal, a HER2:CEN17 ratio of 1.2 is equivocal, and subsequent probe HER2:SMS ratio of 1.3.  Zero out of 1 lymph node had any evidence of disease, giving her final staging of a T1c N0 left breast cancer.  She did have an Oncotype sent off revealing a score of 19, giving her risk of distant recurrence assuming she takes tamoxifen for 5 years of 12% with tamoxifen alone.      She completed adjuvant radiation under the care of Dr Grayson on 5/6/2016.  She started on daily arimedex on 5/7/2016.     INTERVAL HISTORY:  Radhika  "is here for followup. In discussions with Radhika, she is feeling well.  She is taking Arimidex daily and notices very few side effects.  She has rare hot flashes.  She has no issues with arthralgias or myalgias.        She has no problems with lymphedema.  Her symptoms related to frozen shoulder have resolved.     She has no nodules or masses.  She is eating and drinking well.  Her 10-point review of systems is otherwise negative.     PAST MEDICAL HISTORY:  Stage I breast cancer as discussed in the HPI.       MEDICATIONS:  Reviewed     ALLERGIES:  No known drug allergies.      SOCIAL HISTORY:  She is .  She has no tobacco use or alcohol use.  She and her  like to travel frequently.  She has a daughter who is age 34 and no grandchildren.      GYNECOLOGIC HISTORY:  She went through menopause in her late 40s.  She was on hormone replacement therapy for several years.  She has been off of this for approximately 15-20 years at this time.  She is not sure of the exact date.  She has never had any abnormal mammograms previously.  She has not needed any breast biopsies in the past.       FAMILY HISTORY:  She has a maternal aunt who had breast cancer, she believes in her 40s.  Her aunt is still living at the age of 96.       PHYSICAL EXAMINATION:   /68 (BP Location: Right arm, Patient Position: Sitting, Cuff Size: Adult Regular)  Pulse 70  Temp 97.8  F (36.6  C) (Oral)  Resp 18  Ht 1.727 m (5' 7.99\")  Wt 73.7 kg (162 lb 8 oz)  SpO2 98%  BMI 24.71 kg/m2  GENERAL:  well-appearing, NAD  HEENT: no icterus; TRIXIE; EOMI;  moist oral mucosa,  no ulcers or lesions.   NECK:  Supple.  There is no cervical, supraclavicular or axillary adenopathy.   HEART:  Regular rate and rhythm, no mumurs  LUNGS:  Clear bilaterally, non-labored breathing  ABDOMEN:  Soft, nontender, non-distended;no palpable hepatosplenomegaly.    MUSC: no edema  SKIN: no rashes   BREASTS:  Her breast examination reveals well healed scar in " the left breast at the 3 o'clock position post-lumpectomy, no masses appreciated. Right breast exam is unremarkable. No axillary LA.  NEUROLOGIC:  Unremarkable.       LABORATORY DATA:      Mammogram 11/27/2017: benign     ASSESSMENT AND PLAN:      This is a 77-year-old female with a T1c N0 left breast cancer of invasive ductal carcinoma, ER positive, ME positive, HER2 negative ( equivocal by FISH, and 1+ by IHC).   Oncotype  score of 19 giving her risk of distant recurrence assuming she takes tamoxifen for 5 years of 12% with tamoxifen alone.      1.  Breast cancer. She is on adjuvant endocrine therapy alone.  She was not interested in chemotherapy.   She completed adjuvant radiation under the care of Dr Grayson on 5/6/2016. Patient tolerates Arimidex really well. There is no evidence of recurrence on today's exam.    Her next mammogram is due in Fall 2018.  We discussed an appt in 6 months with me      She is taking daily Vit D and Calcium and is doing weight bearing exercise twice a week.        2. Insomnia- this has improved with behavioral therapy     3. Bone health - will continue to monitor bones while on AI. Her last DEXA scan was done at [facility] 2017, she had normal bone density.    RTC in 6 months but she knows to call us with any questions in the interim        Cindy Alves MD

## 2018-05-25 NOTE — PATIENT INSTRUCTIONS
Radhika,    It was a pleasure to see you today.    We will see you back in 6 months with mammogram.    Enjoy the summer.    Cindy Alves

## 2018-05-25 NOTE — LETTER
5/25/2018       RE: Radhika Williamson  [address]  [place] [address]     Dear Colleague,    Thank you for referring your patient, Radhika Williamson, to the [facility]. Please see a copy of my visit note below.    May 25, 2018    Follow up breast cancer     Radhika is a 77-year-old, otherwise healthy female who originally underwent a screening mammography in 12/2015.  This showed an abnormality at the 3 o'clock position and biopsy was recommended.  Biopsy was performed at [facility] on 12/17/2015 showing invasive ductal carcinoma, Randy grade 2.  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  Estrogen was high positivity, progesterone receptor high positivity, and HER2/phuong was negative with a score of 1+.  The patient was away for 6 weeks and then came in for consultation and evaluation by Dr. Luke Salmon on 01/29/2016, at which time they discussed breast conservation.  She had had an MRI at [facility] showing a normal right breast and a 1.6-cm mass at the 3 o'clock position in the left breast.  There were no suspicious lymph nodes.  She went on to have a lumpectomy and sentinel lymph node biopsy of the left breast on 02/24/2016.  The pathology revealed invasive ductal carcinoma, [place] grade 3, measuring 1.7 x 1.7 x 1.6 cm.  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  The surgical margins were negative for invasive cancer or for ductal carcinoma in situ.  This was estrogen receptor positive 90%, progesterone receptor positive 70%,.  HER2 testing was performed on the tumor.  The HER2 testing was performed by FISH revealing HER2 signals per cell of 4.2 which is considered equivocal, a HER2:CEN17 ratio of 1.2 is equivocal, and subsequent probe HER2:SMS ratio of 1.3.  Zero out of 1 lymph node had any evidence of disease, giving her final staging of a T1c N0 left breast cancer.  She did have an Oncotype sent off revealing a score of 19, giving her risk of distant  "recurrence assuming she takes tamoxifen for 5 years of 12% with tamoxifen alone.      She completed adjuvant radiation under the care of Dr Grayson on 5/6/2016.  She started on daily arimedex on 5/7/2016.     INTERVAL HISTORY:  Radhika is here for followup. In discussions with Radhika, she is feeling well.  She is taking Arimidex daily and notices very few side effects.  She has rare hot flashes.  She has no issues with arthralgias or myalgias.        She has no problems with lymphedema.  Her symptoms related to frozen shoulder have resolved.     She has no nodules or masses.  She is eating and drinking well.  Her 10-point review of systems is otherwise negative.     PAST MEDICAL HISTORY:  Stage I breast cancer as discussed in the HPI.       MEDICATIONS:  Reviewed     ALLERGIES:  No known drug allergies.      SOCIAL HISTORY:  She is .  She has no tobacco use or alcohol use.  She and her  like to travel frequently.  She has a daughter who is age 34 and no grandchildren.      GYNECOLOGIC HISTORY:  She went through menopause in her late 40s.  She was on hormone replacement therapy for several years.  She has been off of this for approximately 15-20 years at this time.  She is not sure of the exact date.  She has never had any abnormal mammograms previously.  She has not needed any breast biopsies in the past.       FAMILY HISTORY:  She has a maternal aunt who had breast cancer, she believes in her 40s.  Her aunt is still living at the age of 96.       PHYSICAL EXAMINATION:   /68 (BP Location: Right arm, Patient Position: Sitting, Cuff Size: Adult Regular)  Pulse 70  Temp 97.8  F (36.6  C) (Oral)  Resp 18  Ht 1.727 m (5' 7.99\")  Wt 73.7 kg (162 lb 8 oz)  SpO2 98%  BMI 24.71 kg/m2  GENERAL:  well-appearing, NAD  HEENT: no icterus; TRIXIE; EOMI;  moist oral mucosa,  no ulcers or lesions.   NECK:  Supple.  There is no cervical, supraclavicular or axillary adenopathy.   HEART:  Regular rate and " rhythm, no mumurs  LUNGS:  Clear bilaterally, non-labored breathing  ABDOMEN:  Soft, nontender, non-distended;no palpable hepatosplenomegaly.    MUSC: no edema  SKIN: no rashes   BREASTS:  Her breast examination reveals well healed scar in the left breast at the 3 o'clock position post-lumpectomy, no masses appreciated. Right breast exam is unremarkable. No axillary LA.  NEUROLOGIC:  Unremarkable.       LABORATORY DATA:      Mammogram 11/27/2017: benign     ASSESSMENT AND PLAN:      This is a 77-year-old female with a T1c N0 left breast cancer of invasive ductal carcinoma, ER positive, OR positive, HER2 negative ( equivocal by FISH, and 1+ by IHC).   Oncotype  score of 19 giving her risk of distant recurrence assuming she takes tamoxifen for 5 years of 12% with tamoxifen alone.      1.  Breast cancer. She is on adjuvant endocrine therapy alone.  She was not interested in chemotherapy.   She completed adjuvant radiation under the care of Dr Grayson on 5/6/2016. Patient tolerates Arimidex really well. There is no evidence of recurrence on today's exam.    Her next mammogram is due in Fall 2018.  We discussed an appt in 6 months with me      She is taking daily Vit D and Calcium and is doing weight bearing exercise twice a week.        2. Insomnia- this has improved with behavioral therapy     3. Bone health - will continue to monitor bones while on AI. Her last DEXA scan was done at [facility] 2017, she had normal bone density.    RTC in 6 months but she knows to call us with any questions in the interim        Cindy Alves MD

## 2018-05-25 NOTE — MR AVS SNAPSHOT
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    We will see you back in 6 months with mammogram.    Enjoy the summer.    Cindy Alves          Follow-ups after your visit        Your next 10 appointments already scheduled     Dec 03, 2018  2:30 PM CST   MA SCREENING DIGITAL BILATERAL with [id number]   [facility] ([facility]    [address] [place] [id number] [phone]           Do not use any powder, lotion or deodorant under your arms or on your breast. If you do, we will ask you to remove it before your exam.  Wear comfortable, two-piece clothing.  If you have any allergies, tell your care team.  Bring any previous mammograms from other facilities or have them mailed to the breast center. Three-dimensional (3D) mammograms are available at [facility] locations in [facility], [facility], [place], [place], and [place]. [facility] locations include [place] [facility] in [place]. Benefits of 3D mammograms include: - Improved rate of cancer detection - Decreases your chance of having to go back for more tests, which means fewer: - \"False-positive\" results (This means that there is an abnormal area but it isn't cancer.) - Invasive testing procedures, such as a biopsy or surgery - Can provide clearer images of the breast if you have dense breast tissue. 3D mammography is an optional exam " that anyone can have with a 2D mammogram. It doesn't replace or take the place of a 2D mammogram. 2D mammograms remain an effective screening test for all women.  Not all insurance companies cover the cost of a 3D mammogram. Check with your insurance.            Dec 03, 2018  3:00 PM CST   (Arrive by 2:45 PM)   Return Visit with Cindy Alves MD   [facility] ([facility])    [address]  [address]  [facility] [address] [phone]              Who to contact     If you have questions or need follow up information about today's clinic visit or your schedule please contact [facility] directly at [phone].  Normal or non-critical lab and imaging results will be communicated to you by unbound technologieshart, letter or phone within 4 business days after the clinic has received the results. If you do not hear from us within 7 days, please contact the clinic through unbound technologieshart or phone. If you have a critical or abnormal lab result, we will notify you by phone as soon as possible.  Submit refill requests through [employer] Pages or call your pharmacy and they will forward the refill request to us. Please allow 3 business days for your refill to be completed.          Additional Information About Your Visit        Explore[employer]To Yellow Pages Information     [employer] Pages gives you secure access to your electronic health record. If you see a primary care provider, you can also send messages to your care team and make appointments. If you have questions, please call your primary care clinic.  If you do not have a primary care provider, please call [phone] and they will assist you.        Care EveryWhere ID     This is your Care EveryWhere ID. This could be used by other organizations to access your [facility] medical records  [id number]        Your Vitals Were     Pulse Temperature Respirations Height Pulse Oximetry BMI (Body Mass Index)    70 97.8  F (36.6  C) (Oral) 18 1.727 m (5'  "7.99\") 98% 24.71 kg/m2       Blood Pressure from Last 3 Encounters:   05/25/18 108/68   11/27/17 119/68   05/22/17 132/68    Weight from Last 3 Encounters:   05/25/18 73.7 kg (162 lb 8 oz)   11/27/17 71.5 kg (157 lb 9.6 oz)   05/22/17 68.2 kg (150 lb 4.8 oz)                 Where to get your medicines      These medications were sent to [facility] - [place], [place] - [address] [place] [address]  [address] [facility] [address], [place] [address]     Phone:  [phone]     anastrozole 1 MG tablet          Primary Care Provider Office Phone # Fax #    Mckenna Fish [phone]       [facility] [address]  [facility] [address]        Equal Access to Services     MALINDA CASTORENA AH: Hadii javier rosarioo Soeva, waaxda luconstantinadaha, qaybta kaalmada adeegyada, davida burt . So [facility] [phone].    ATENCIÓN: Si patric yates, tiene a parada disposición servicios gratuitos de asistencia lingüística. Llame al [phone].    We comply with applicable federal civil rights laws and [place] laws. We do not discriminate on the basis of race, color, national origin, age, disability, sex, sexual orientation, or gender identity.            Thank you!     Thank you for choosing [facility]  for your care. Our goal is always to provide you with excellent care. Hearing back from our patients is one way we can continue to improve our services. Please take a few minutes to complete the written survey that you may receive in the mail after your visit with us. Thank you!             Your Updated Medication List - Protect others around you: Learn how to safely use, store and throw away your medicines at www.disposemymeds.org.          This list is accurate as of 5/25/18 11:59 PM.  Always use your most recent med list.                   Brand Name Dispense Instructions for use Diagnosis    anastrozole 1 MG tablet    ARIMIDEX    90 tablet    Take 1 " tablet (1 mg) by mouth daily    Malignant neoplasm of lower-outer quadrant of left breast of female, estrogen receptor positive (H)       betamethasone (augmented) 0.05 % lotion    DIPROLENE     Apply to scalp 1 time daily    Malignant neoplasm of lower-outer quadrant of left breast of female, estrogen receptor positive (H)       calcium citrate-vitamin D 315-250 MG-UNIT Tabs per tablet    CITRACAL     Take 2 tablets by mouth daily 500 mg daily        clobetasol 0.05 % ointment    TEMOVATE     Apply to affected areas on arms and legs twice a day for up to two weeks at a time as needed for severe flares.        desonide 0.05 % cream    DESOWEN     Apply to affected areas on ears and groin twice a day as needed.        fluocinonide 0.05 % solution    LIDEX     apply to scalp twice daily as needed        hydrocortisone 2.5 % cream      Apply a thin layer to affected skin twice a day for up to  3-4 weeks face only 1 week        IMMUNE ENHANCE PO      daily        KRILL OIL PO      Take 500 mg by mouth daily        MELATONIN PO      Take 3 mg by mouth nightly as needed        multivitamin, therapeutic with minerals Tabs tablet      Take 1 tablet by mouth daily        omeprazole 20 MG CR capsule    priLOSEC      Malignant neoplasm of lower-outer quadrant of left breast of female, estrogen receptor positive (H)       PROBIOTIC DAILY Caps      Take 2 capsules by mouth daily        TRAZODONE HCL PO      Take 50 mg by mouth At Bedtime        triamcinolone 0.1 % ointment    KENALOG      Malignant neoplasm of lower-outer quadrant of left breast of female, estrogen receptor positive (H)       Valerian 500 MG Caps      Take by mouth At Bedtime        VITAMIN D3 PO      Take 2,000 Units by mouth daily        ZANTAC PO      Take 150 mg by mouth as needed Reported on 5/22/2017

## 2018-12-03 ENCOUNTER — RADIANT APPOINTMENT (OUTPATIENT)
Dept: MAMMOGRAPHY | Facility: CLINIC | Age: 78
End: 2018-12-03
Payer: COMMERCIAL

## 2018-12-03 ENCOUNTER — ONCOLOGY VISIT (OUTPATIENT)
Dept: ONCOLOGY | Facility: CLINIC | Age: 78
End: 2018-12-03
Attending: INTERNAL MEDICINE
Payer: MEDICARE

## 2018-12-03 VITALS
RESPIRATION RATE: 16 BRPM | WEIGHT: 162.5 LBS | HEART RATE: 64 BPM | OXYGEN SATURATION: 97 % | TEMPERATURE: 97 F | SYSTOLIC BLOOD PRESSURE: 126 MMHG | BODY MASS INDEX: 24.63 KG/M2 | DIASTOLIC BLOOD PRESSURE: 72 MMHG | HEIGHT: 68 IN

## 2018-12-03 DIAGNOSIS — Z17.0 MALIGNANT NEOPLASM OF LOWER-OUTER QUADRANT OF LEFT BREAST OF FEMALE, ESTROGEN RECEPTOR POSITIVE (H): ICD-10-CM

## 2018-12-03 DIAGNOSIS — C50.512 MALIGNANT NEOPLASM OF LOWER-OUTER QUADRANT OF LEFT BREAST OF FEMALE, ESTROGEN RECEPTOR POSITIVE (H): ICD-10-CM

## 2018-12-03 DIAGNOSIS — Z17.0 MALIGNANT NEOPLASM OF LEFT BREAST IN FEMALE, ESTROGEN RECEPTOR POSITIVE, UNSPECIFIED SITE OF BREAST (H): Primary | ICD-10-CM

## 2018-12-03 DIAGNOSIS — C50.912 MALIGNANT NEOPLASM OF LEFT BREAST IN FEMALE, ESTROGEN RECEPTOR POSITIVE, UNSPECIFIED SITE OF BREAST (H): Primary | ICD-10-CM

## 2018-12-03 DIAGNOSIS — Z12.31 ENCOUNTER FOR SCREENING MAMMOGRAM FOR MALIGNANT NEOPLASM OF BREAST: ICD-10-CM

## 2018-12-03 PROCEDURE — 99213 OFFICE O/P EST LOW 20 MIN: CPT | Mod: GC | Performed by: INTERNAL MEDICINE

## 2018-12-03 PROCEDURE — G0463 HOSPITAL OUTPT CLINIC VISIT: HCPCS | Mod: ZF

## 2018-12-03 ASSESSMENT — PAIN SCALES - GENERAL: PAINLEVEL: NO PAIN (0)

## 2018-12-03 NOTE — LETTER
"12/3/2018       RE: Radhika Williamson  [address]  [place] [address]     Dear Colleague,    Thank you for referring your patient, Radhika Williamson, to the [facility]. Please see a copy of my visit note below.    Oncology Follow-up Visit:  December 3, 2018    Diagnosis: Breast Cancer    History Of Present Illness:  Ms. Williamson is a 78 year old female is here for follow-up of of left-sided breast cancer. Her history per the chart is as follows:    \"She underwent a screening mammography in 12/2015.  This showed an abnormality at the 3 o'clock position and biopsy was recommended.  Biopsy was performed at [facility] on 12/17/2015 showing invasive ductal carcinoma, Randy grade 2.  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  Estrogen was high positivity, progesterone receptor high positivity, and HER2/phuong was negative with a score of 1+.  The patient was away for 6 weeks and then came in for consultation and evaluation by Dr. Luke Salmon on 01/29/2016, at which time they discussed breast conservation.  She had had an MRI at [facility] showing a normal right breast and a 1.6-cm mass at the 3 o'clock position in the left breast.  There were no suspicious lymph nodes.  She went on to have a lumpectomy and sentinel lymph node biopsy of the left breast on 02/24/2016.  The pathology revealed invasive ductal carcinoma, [place] grade 3, measuring 1.7 x 1.7 x 1.6 cm.  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  The surgical margins were negative for invasive cancer or for ductal carcinoma in situ.  This was estrogen receptor positive 90%, progesterone receptor positive 70%,.  HER2 testing was performed on the tumor.  The HER2 testing was performed by FISH revealing HER2 signals per cell of 4.2 which is considered equivocal, a HER2:CEN17 ratio of 1.2 is equivocal, and subsequent probe HER2:SMS ratio of 1.3.  Zero out of 1 lymph node had any evidence of disease, giving her " "final staging of a T1c N0 left breast cancer.  She did have an Oncotype sent off revealing a score of 19, giving her risk of distant recurrence assuming she takes tamoxifen for 5 years of 12% with tamoxifen alone.  She completed adjuvant radiation under the care of Dr Grayson on 5/6/2016.  She started on daily arimedex on 5/7/2016.\"       Interval history:  She is here for follow-up today and to have a mammogram performed. She is feeling well. She continues to take Arimidex and is in need of refills. She has not noticed significant side effects. She denies problems with hot flashes, arthralgias, new lumps in her breasts or elsewhere, or lymphadema in bilateral arms. She does endorse some mild vaginal dryness for which she intermittently uses lubricant; this has helped prevent dyspareunia.     She has questions today regarding some mild dryness/crusting noted over the left nipple. She reports that it has been present for months. She has not had any drainage from the nipple/breast and the breast has not been painful.     She als bring sin an article today from the newspaper and wanted more information regarding the benefits of taking vitamin D.     Review Of Systems:  10 point ROS was otherwise negative.     Past medical, social, surgical, and family histories reviewed.    Allergies:  Allergies as of 12/03/2018 - Gabriele as Reviewed 12/03/2018   Allergen Reaction Noted     Molds & smuts Other (See Comments) 01/29/2016     Dust mite extract Other (See Comments) 10/17/2016     Dust mites  02/19/2016     Mold  02/19/2016       Current Medications:  Current Outpatient Prescriptions   Medication Sig Dispense Refill     anastrozole (ARIMIDEX) 1 MG tablet Take 1 tablet (1 mg) by mouth daily 90 tablet 3     betamethasone, augmented, (DIPROLENE) 0.05 % lotion Apply to scalp 1 time daily       calcium citrate-vitamin D (CITRACAL) 315-250 MG-UNIT TABS Take 2 tablets by mouth daily 500 mg daily       Cholecalciferol (VITAMIN D3 PO) " "Take 2,000 Units by mouth daily        clobetasol (TEMOVATE) 0.05 % ointment Apply to affected areas on arms and legs twice a day for up to two weeks at a time as needed for severe flares.       desonide (DESOWEN) 0.05 % cream Apply to affected areas on ears and groin twice a day as needed.       fluocinonide (LIDEX) 0.05 % external solution apply to scalp twice daily as needed       hydrocortisone 2.5 % cream Apply a thin layer to affected skin twice a day for up to  3-4 weeks face only 1 week       KRILL OIL PO Take 500 mg by mouth daily        MELATONIN PO Take 3 mg by mouth nightly as needed       multivitamin, therapeutic with minerals (THERA-VIT-M) TABS Take 1 tablet by mouth daily       Nutritional Supplements (IMMUNE ENHANCE PO) daily        omeprazole (PRILOSEC) 20 MG CR capsule        Probiotic Product (PROBIOTIC DAILY) CAPS Take 2 capsules by mouth daily       TRAZODONE HCL PO Take 50 mg by mouth At Bedtime       triamcinolone (KENALOG) 0.1 % ointment        Valerian 500 MG CAPS Take by mouth At Bedtime           Physical Exam:  /72 (BP Location: Right arm, Patient Position: Chair, Cuff Size: Adult Regular)  Pulse 64  Temp 97  F (36.1  C) (Oral)  Resp 16  Ht 1.727 m (5' 7.99\")  Wt 73.7 kg (162 lb 8 oz)  SpO2 97%  BMI 24.71 kg/m2    GENERAL APPEARANCE: healthy elderly female alert and no distress     HENT: Sclerae clear. Lips appear moist.      NECK: No adenopathy, no asymmetry or masses     LYMPHATICS: No cervical, supraclavicular, axillary epitrochlear lymphadenopathy     RESP: lungs clear to auscultation - no rales, rhonchi or wheezes     CARDIOVASCULAR: Regular rates and rhythm, normal S1 S2, no murmur.     ABDOMEN:  Soft, nontender, no HSM or masses and bowel sounds normal     BREAST: Left nipple with central area of dry skin noted. No drainage or erythema appreciated. No masses palpated.      MUSCULOSKELETAL: No lymphadema in b/l arms. No edema b/l LE.     SKIN: no suspicious lesions or " sujatha     PSYCHIATRIC: mentation appears normal and affect normal    Laboratory/Imaging Studies  Screening mammogram 12/3/2018:  BREAST DENSITY: Scattered fibroglandular densities.  COMMENTS: Breast conservation therapy changes on the left. No  significant change in either breast.  IMPRESSION: BI-RADS CATEGORY: 2 - Benign Finding(s).  RECOMMENDED FOLLOW-UP: Annual Mammography    ASSESSMENT/PLAN:  77-year-old female with history of invasive ductal carcinoma left breast T1c N0, ER positive, TX positive, HER2 negative ( equivocal by FISH, and 1+ by IHC).   Oncotype score  was 19 (risk of distant recurrence assuming she takes tamoxifen for 5 years of 12% with tamoxifen alone).     1) Breast cancer: She is s/p adjuvant radiation and now remains on Arimidex and is tolerating this medication well. No evidence of recurrence on today's clinical exam or on mammogram. She was counseled regarding continuation of Arimidex. Plan for follow-up in 6 months.     2) Bone health: Last DEXA 5/2017 and was without evidence of osteoporosis or osteopenia. She was counseled regarding continuation of vitamin D and calcium supplementation in the setting of Arimidex use.     Patient was staffed with Dr. Alves.    Cecilia Yeung  Medicine/pediatrics PGY-4  Pager [phone]    ------------------------------------------------------------------------   Addendum:  Pt was seen and evaluated by me with Dr Yeung.    Radhika returns today for follow up.  She has no s/s of recurrence.  ON breast exam, no nodules or masses.  No lymphadenopathy.    Continue AI with annual mammogram.    Cindy Alves MD

## 2018-12-03 NOTE — PROGRESS NOTES
"Oncology Follow-up Visit:  December 3, 2018    Diagnosis: Breast Cancer    History Of Present Illness:  Ms. Williamson is a 78 year old female is here for follow-up of of left-sided breast cancer. Her history per the chart is as follows:    \"She underwent a screening mammography in 12/2015.  This showed an abnormality at the 3 o'clock position and biopsy was recommended.  Biopsy was performed at [facility] on 12/17/2015 showing invasive ductal carcinoma, Randy grade 2.  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  Estrogen was high positivity, progesterone receptor high positivity, and HER2/phuong was negative with a score of 1+.  The patient was away for 6 weeks and then came in for consultation and evaluation by Dr. Luke Salmon on 01/29/2016, at which time they discussed breast conservation.  She had had an MRI at [facility] showing a normal right breast and a 1.6-cm mass at the 3 o'clock position in the left breast.  There were no suspicious lymph nodes.  She went on to have a lumpectomy and sentinel lymph node biopsy of the left breast on 02/24/2016.  The pathology revealed invasive ductal carcinoma, Randy grade 3, measuring 1.7 x 1.7 x 1.6 cm.  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  The surgical margins were negative for invasive cancer or for ductal carcinoma in situ.  This was estrogen receptor positive 90%, progesterone receptor positive 70%,.  HER2 testing was performed on the tumor.  The HER2 testing was performed by FISH revealing HER2 signals per cell of 4.2 which is considered equivocal, a HER2:CEN17 ratio of 1.2 is equivocal, and subsequent probe HER2:SMS ratio of 1.3.  Zero out of 1 lymph node had any evidence of disease, giving her final staging of a T1c N0 left breast cancer.  She did have an Oncotype sent off revealing a score of 19, giving her risk of distant recurrence assuming she takes tamoxifen for 5 years of 12% with tamoxifen alone.  She completed " "adjuvant radiation under the care of Dr Grayson on 5/6/2016.  She started on daily arimedex on 5/7/2016.\"       Interval history:  She is here for follow-up today and to have a mammogram performed. She is feeling well. She continues to take Arimidex and is in need of refills. She has not noticed significant side effects. She denies problems with hot flashes, arthralgias, new lumps in her breasts or elsewhere, or lymphadema in bilateral arms. She does endorse some mild vaginal dryness for which she intermittently uses lubricant; this has helped prevent dyspareunia.     She has questions today regarding some mild dryness/crusting noted over the left nipple. She reports that it has been present for months. She has not had any drainage from the nipple/breast and the breast has not been painful.     She als bring sin an article today from the newspaper and wanted more information regarding the benefits of taking vitamin D.     Review Of Systems:  10 point ROS was otherwise negative.     Past medical, social, surgical, and family histories reviewed.    Allergies:  Allergies as of 12/03/2018 - Gabriele as Reviewed 12/03/2018   Allergen Reaction Noted     Molds & smuts Other (See Comments) 01/29/2016     Dust mite extract Other (See Comments) 10/17/2016     Dust mites  02/19/2016     Mold  02/19/2016       Current Medications:  Current Outpatient Prescriptions   Medication Sig Dispense Refill     anastrozole (ARIMIDEX) 1 MG tablet Take 1 tablet (1 mg) by mouth daily 90 tablet 3     betamethasone, augmented, (DIPROLENE) 0.05 % lotion Apply to scalp 1 time daily       calcium citrate-vitamin D (CITRACAL) 315-250 MG-UNIT TABS Take 2 tablets by mouth daily 500 mg daily       Cholecalciferol (VITAMIN D3 PO) Take 2,000 Units by mouth daily        clobetasol (TEMOVATE) 0.05 % ointment Apply to affected areas on arms and legs twice a day for up to two weeks at a time as needed for severe flares.       desonide (DESOWEN) 0.05 % cream " "Apply to affected areas on ears and groin twice a day as needed.       fluocinonide (LIDEX) 0.05 % external solution apply to scalp twice daily as needed       hydrocortisone 2.5 % cream Apply a thin layer to affected skin twice a day for up to  3-4 weeks face only 1 week       KRILL OIL PO Take 500 mg by mouth daily        MELATONIN PO Take 3 mg by mouth nightly as needed       multivitamin, therapeutic with minerals (THERA-VIT-M) TABS Take 1 tablet by mouth daily       Nutritional Supplements (IMMUNE ENHANCE PO) daily        omeprazole (PRILOSEC) 20 MG CR capsule        Probiotic Product (PROBIOTIC DAILY) CAPS Take 2 capsules by mouth daily       TRAZODONE HCL PO Take 50 mg by mouth At Bedtime       triamcinolone (KENALOG) 0.1 % ointment        Valerian 500 MG CAPS Take by mouth At Bedtime           Physical Exam:  /72 (BP Location: Right arm, Patient Position: Chair, Cuff Size: Adult Regular)  Pulse 64  Temp 97  F (36.1  C) (Oral)  Resp 16  Ht 1.727 m (5' 7.99\")  Wt 73.7 kg (162 lb 8 oz)  SpO2 97%  BMI 24.71 kg/m2    GENERAL APPEARANCE: healthy elderly female alert and no distress     HENT: Sclerae clear. Lips appear moist.      NECK: No adenopathy, no asymmetry or masses     LYMPHATICS: No cervical, supraclavicular, axillary epitrochlear lymphadenopathy     RESP: lungs clear to auscultation - no rales, rhonchi or wheezes     CARDIOVASCULAR: Regular rates and rhythm, normal S1 S2, no murmur.     ABDOMEN:  Soft, nontender, no HSM or masses and bowel sounds normal     BREAST: Left nipple with central area of dry skin noted. No drainage or erythema appreciated. No masses palpated.      MUSCULOSKELETAL: No lymphadema in b/l arms. No edema b/l LE.     SKIN: no suspicious lesions or rashes     PSYCHIATRIC: mentation appears normal and affect normal    Laboratory/Imaging Studies  Screening mammogram 12/3/2018:  BREAST DENSITY: Scattered fibroglandular densities.  COMMENTS: Breast conservation therapy " changes on the left. No  significant change in either breast.  IMPRESSION: BI-RADS CATEGORY: 2 - Benign Finding(s).  RECOMMENDED FOLLOW-UP: Annual Mammography    ASSESSMENT/PLAN:  77-year-old female with history of invasive ductal carcinoma left breast T1c N0, ER positive, IN positive, HER2 negative ( equivocal by FISH, and 1+ by IHC).   Oncotype score was 19 (risk of distant recurrence assuming she takes tamoxifen for 5 years of 12% with tamoxifen alone).     1) Breast cancer: She is s/p adjuvant radiation and now remains on Arimidex and is tolerating this medication well. No evidence of recurrence on today's clinical exam or on mammogram. She was counseled regarding continuation of Arimidex. Plan for follow-up in 6 months.     2) Bone health: Last DEXA 5/2017 and was without evidence of osteoporosis or osteopenia. She was counseled regarding continuation of vitamin D and calcium supplementation in the setting of Arimidex use.     Patient was staffed with Dr. Alves.    Cecilia Yeung  Medicine/pediatrics PGY-4  Pager [phone]    ------------------------------------------------------------------------   Addendum:  Pt was seen and evaluated by me with Dr Yeung.    Radhika returns today for follow up.  She has no s/s of recurrence.  ON breast exam, no nodules or masses.  No lymphadenopathy.    Continue AI with annual mammogram.    Cindy Alves MD

## 2018-12-03 NOTE — NURSING NOTE
"Oncology Rooming Note    December 3, 2018 3:18 PM   Radhika Williamson is a 78 year old female who presents for:    Chief Complaint   Patient presents with     Oncology Clinic Visit     return - breast ca      Initial Vitals: /72 (BP Location: Right arm, Patient Position: Chair, Cuff Size: Adult Regular)  Pulse 64  Temp 97  F (36.1  C) (Oral)  Resp 16  Ht 1.727 m (5' 7.99\")  Wt 73.7 kg (162 lb 8 oz)  SpO2 97%  BMI 24.71 kg/m2 Estimated body mass index is 24.71 kg/(m^2) as calculated from the following:    Height as of this encounter: 1.727 m (5' 7.99\").    Weight as of this encounter: 73.7 kg (162 lb 8 oz). Body surface area is 1.88 meters squared.  No Pain (0) Comment: Data Unavailable   No LMP recorded. Patient is postmenopausal.  Allergies reviewed: Yes  Medications reviewed: Yes    Medications: refill needed for Arimidex   Pharmacy name entered into EPIC: Data Unavailable    Clinical concerns: none, go over mammogram      6 minutes for nursing intake (face to face time)     Mary Gloria CMA            "

## 2018-12-03 NOTE — MR AVS SNAPSHOT
After Visit Summary   12/3/2018    Radhika Williamson    MRN: [id number]           Patient Information     Date Of Birth          1940        Visit Information        Provider Department      12/3/2018 3:00 PM Cindy Alves MD [facility]         Follow-ups after your visit        Your next 10 appointments already scheduled     Jun 03, 2019 11:30 AM CDT   (Arrive by 11:15 AM)   Return Visit with Cindy Alves MD   [facility] ([facility])    [address] [place] [address]  [address]  [facility] [address] [phone]              Who to contact     If you have questions or need follow up information about today's clinic visit or your schedule please contact [facility] directly at [phone].  Normal or non-critical lab and imaging results will be communicated to you by MyChart, letter or phone within 4 business days after the clinic has received the results. If you do not hear from us within 7 days, please contact the clinic through MyChart or phone. If you have a critical or abnormal lab result, we will notify you by phone as soon as possible.  Submit refill requests through [employer] or call your pharmacy and they will forward the refill request to us. Please allow 3 business days for your refill to be completed.          Additional Information About Your Visit        MyChart Information     [employer]Less gives you secure access to your electronic health record. If you see a primary care provider, you can also send messages to your care team and make appointments. If you have questions, please call your primary care clinic.  If you do not have a primary care provider, please call [phone] and they will assist you.        Care EveryWhere ID     This is your Care EveryWhere ID. This could be used by other organizations to access your [place] medical records  [id number]        Your Vitals Were     Pulse  "Temperature Respirations Height Pulse Oximetry BMI (Body Mass Index)    64 97  F (36.1  C) (Oral) 16 1.727 m (5' 7.99\") 97% 24.71 kg/m2       Blood Pressure from Last 3 Encounters:   12/03/18 126/72   05/25/18 108/68   11/27/17 119/68    Weight from Last 3 Encounters:   12/03/18 73.7 kg (162 lb 8 oz)   05/25/18 73.7 kg (162 lb 8 oz)   11/27/17 71.5 kg (157 lb 9.6 oz)              Today, you had the following     No orders found for display       Primary Care Provider Office Phone # Fax #    Mckenna Fish [phone]       [facility] [address] [facility] [address]        Equal Access to Services     MALINDA CASTORENA : Hadii aad ku hadashvioletta Soeva, waaxda luqadaha, qaybta kaalmada ademisael, davida burt . So [facility] [phone].    ATENCIÓN: Si habla español, tiene a parada disposición servicios gratuitos de asistencia lingüística. Nancy al [phone].    We comply with applicable federal civil rights laws and [place] laws. We do not discriminate on the basis of race, color, national origin, age, disability, sex, sexual orientation, or gender identity.            Thank you!     Thank you for choosing [facility]  for your care. Our goal is always to provide you with excellent care. Hearing back from our patients is one way we can continue to improve our services. Please take a few minutes to complete the written survey that you may receive in the mail after your visit with us. Thank you!             Your Updated Medication List - Protect others around you: Learn how to safely use, store and throw away your medicines at www.disposemymeds.org.          This list is accurate as of 12/3/18  3:59 PM.  Always use your most recent med list.                   Brand Name Dispense Instructions for use Diagnosis    anastrozole 1 MG tablet    ARIMIDEX    90 tablet    Take 1 tablet (1 mg) by mouth daily    Malignant neoplasm of lower-outer quadrant of " left breast of female, estrogen receptor positive (H)       augmented betamethasone dipropionate 0.05 % external lotion    DIPROLENE     Apply to scalp 1 time daily    Malignant neoplasm of lower-outer quadrant of left breast of female, estrogen receptor positive (H)       calcium citrate-vitamin D 315-250 MG-UNIT Tabs per tablet    CITRACAL     Take 2 tablets by mouth daily 500 mg daily        clobetasol 0.05 % external ointment    TEMOVATE     Apply to affected areas on arms and legs twice a day for up to two weeks at a time as needed for severe flares.        desonide 0.05 % external cream    DESOWEN     Apply to affected areas on ears and groin twice a day as needed.        fluocinonide 0.05 % external solution    LIDEX     apply to scalp twice daily as needed        hydrocortisone 2.5 % cream      Apply a thin layer to affected skin twice a day for up to  3-4 weeks face only 1 week        IMMUNE ENHANCE PO      daily        KRILL OIL PO      Take 500 mg by mouth daily        MELATONIN PO      Take 3 mg by mouth nightly as needed        multivitamin w/minerals tablet      Take 1 tablet by mouth daily        omeprazole 20 MG DR capsule    priLOSEC      Malignant neoplasm of lower-outer quadrant of left breast of female, estrogen receptor positive (H)       PROBIOTIC DAILY Caps      Take 2 capsules by mouth daily        TRAZODONE HCL PO      Take 50 mg by mouth At Bedtime        triamcinolone 0.1 % external ointment    KENALOG      Malignant neoplasm of lower-outer quadrant of left breast of female, estrogen receptor positive (H)       Valerian 500 MG Caps      Take by mouth At Bedtime        VITAMIN D3 PO      Take 2,000 Units by mouth daily

## 2019-06-03 ENCOUNTER — ONCOLOGY VISIT (OUTPATIENT)
Dept: ONCOLOGY | Facility: CLINIC | Age: 79
End: 2019-06-03
Attending: PHYSICIAN ASSISTANT
Payer: MEDICARE

## 2019-06-03 VITALS
RESPIRATION RATE: 16 BRPM | BODY MASS INDEX: 25.34 KG/M2 | OXYGEN SATURATION: 99 % | WEIGHT: 167.2 LBS | HEART RATE: 64 BPM | SYSTOLIC BLOOD PRESSURE: 133 MMHG | DIASTOLIC BLOOD PRESSURE: 69 MMHG | TEMPERATURE: 98.2 F | HEIGHT: 68 IN

## 2019-06-03 DIAGNOSIS — C50.512 MALIGNANT NEOPLASM OF LOWER-OUTER QUADRANT OF LEFT BREAST OF FEMALE, ESTROGEN RECEPTOR POSITIVE (H): ICD-10-CM

## 2019-06-03 DIAGNOSIS — Z17.0 MALIGNANT NEOPLASM OF LEFT BREAST IN FEMALE, ESTROGEN RECEPTOR POSITIVE, UNSPECIFIED SITE OF BREAST (H): Primary | ICD-10-CM

## 2019-06-03 DIAGNOSIS — Z17.0 MALIGNANT NEOPLASM OF LOWER-OUTER QUADRANT OF LEFT BREAST OF FEMALE, ESTROGEN RECEPTOR POSITIVE (H): ICD-10-CM

## 2019-06-03 DIAGNOSIS — C50.912 MALIGNANT NEOPLASM OF LEFT BREAST IN FEMALE, ESTROGEN RECEPTOR POSITIVE, UNSPECIFIED SITE OF BREAST (H): Primary | ICD-10-CM

## 2019-06-03 DIAGNOSIS — L98.9 SKIN LESION: ICD-10-CM

## 2019-06-03 PROCEDURE — 99214 OFFICE O/P EST MOD 30 MIN: CPT | Mod: ZP | Performed by: PHYSICIAN ASSISTANT

## 2019-06-03 PROCEDURE — G0463 HOSPITAL OUTPT CLINIC VISIT: HCPCS | Mod: ZF

## 2019-06-03 RX ORDER — TRAZODONE HYDROCHLORIDE 50 MG/1
100 TABLET, FILM COATED ORAL AT BEDTIME
COMMUNITY
Start: 2019-05-07

## 2019-06-03 RX ORDER — ANASTROZOLE 1 MG/1
1 TABLET ORAL DAILY
Qty: 90 TABLET | Refills: 3 | Status: SHIPPED | OUTPATIENT
Start: 2019-06-03 | End: 2020-06-08

## 2019-06-03 ASSESSMENT — MIFFLIN-ST. JEOR: SCORE: 1291.75

## 2019-06-03 ASSESSMENT — PAIN SCALES - GENERAL: PAINLEVEL: NO PAIN (0)

## 2019-06-03 NOTE — LETTER
6/3/2019      RE: Radhika Williamson  [address]  [facility] [address]       Oncology/Hematology Visit Note  Torres 3, 2019    Reason for Visit: Follow up of breast cancer    History of Present Illness: Radhika Williamson is a 79 year old female otherwise healthy female who originally underwent a screening mammography in 12/2015.  This showed an abnormality at the 3 o'clock position and biopsy was recommended.  Biopsy was performed at [facility] on 12/17/2015 showing invasive ductal carcinoma, Randy grade 2.  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  Estrogen was high positivity, progesterone receptor high positivity, and HER2/phuong was negative with a score of 1+.  The patient was away for 6 weeks and then came in for consultation and evaluation by Dr. Luke Salmon on 01/29/2016, at which time they discussed breast conservation.  She had had an MRI at [facility] showing a normal right breast and a 1.6-cm mass at the 3 o'clock position in the left breast.  There were no suspicious lymph nodes.      She went on to have a lumpectomy and sentinel lymph node biopsy of the left breast on 02/24/2016.  The pathology revealed invasive ductal carcinoma, [place] grade 3, measuring 1.7 x 1.7 x 1.6 cm.  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  The surgical margins were negative for invasive cancer or for ductal carcinoma in situ.  This was estrogen receptor positive 90%, progesterone receptor positive 70%,.  HER2 testing was performed on the tumor.  The HER2 testing was performed by FISH revealing HER2 signals per cell of 4.2 which is considered equivocal, a HER2:CEN17 ratio of 1.2 is equivocal, and subsequent probe HER2:SMS ratio of 1.3.  Zero out of 1 lymph node had any evidence of disease, giving her final staging of a T1c N0 left breast cancer.  She did have an Oncotype sent off revealing a score of 19, giving her risk of distant recurrence assuming she takes tamoxifen for 5 years of 12%  with tamoxifen alone.       She completed adjuvant radiation under the care of Dr Grayson on 5/6/2016.  She started on daily arimedex on 5/7/2016. Annual mammogram December 2018 negative. She returns today for ongoing follow-up.     Interval History:  Ms. Williamson returns to clinic today unaccompanied. She overall is feeling well. Her main concern is an ongoing scab on her left nipple. This has been present for many months now and she has mentioned it previously and has been told it wasn't concerning. She denies any pain or changes to the lesion, though she is bothered by it.     She otherwise feels well. She tolerates the Amiridex well with no side effects. She is exercising frequently and taking her vit D/calcium for bone health. GERD well controlled with Zantac BID and PRN omeprazole. No new medical concerns and otherwise review of systems negative.     Review of Systems:  Patient denies fevers, chills, night sweats, unexplained weight changes, headaches, dizziness, vision or hearing changes, new lumps or bumps, chest pain, shortness of breath, cough, abdominal pain, nausea, vomiting, changes to bowel or bladder, swelling of extremities, bleeding issues, or rash.    Current Outpatient Medications   Medication Sig Dispense Refill     anastrozole (ARIMIDEX) 1 MG tablet Take 1 tablet (1 mg) by mouth daily 90 tablet 3     betamethasone, augmented, (DIPROLENE) 0.05 % lotion Apply to scalp 1 time daily       calcium citrate-vitamin D (CITRACAL) 315-250 MG-UNIT TABS Take 2 tablets by mouth daily 500 mg daily       Cholecalciferol (VITAMIN D3 PO) Take 2,000 Units by mouth daily        clobetasol (TEMOVATE) 0.05 % ointment Apply to affected areas on arms and legs twice a day for up to two weeks at a time as needed for severe flares.       desonide (DESOWEN) 0.05 % cream Apply to affected areas on ears and groin twice a day as needed.       fluocinonide (LIDEX) 0.05 % external solution apply to scalp twice daily as needed    "    hydrocortisone 2.5 % cream Apply a thin layer to affected skin twice a day for up to  3-4 weeks face only 1 week       KRILL OIL PO Take 500 mg by mouth daily        MELATONIN PO Take 3 mg by mouth nightly as needed       multivitamin, therapeutic with minerals (THERA-VIT-M) TABS Take 1 tablet by mouth daily       Nutritional Supplements (IMMUNE ENHANCE PO) daily        omeprazole (PRILOSEC) 20 MG CR capsule        Probiotic Product (PROBIOTIC DAILY) CAPS Take 2 capsules by mouth daily       TRAZODONE HCL PO Take 50 mg by mouth At Bedtime       triamcinolone (KENALOG) 0.1 % ointment        Valerian 500 MG CAPS Take by mouth At Bedtime          Physical Examination:  /69   Pulse 64   Temp 98.2  F (36.8  C) (Oral)   Resp 16   Ht 1.727 m (5' 7.99\")   Wt 75.8 kg (167 lb 3.2 oz)   SpO2 99%   BMI 25.43 kg/m     Wt Readings from Last 10 Encounters:   12/03/18 73.7 kg (162 lb 8 oz)   05/25/18 73.7 kg (162 lb 8 oz)   11/27/17 71.5 kg (157 lb 9.6 oz)   05/22/17 68.2 kg (150 lb 4.8 oz)   02/27/17 67.5 kg (148 lb 12.8 oz)   11/28/16 66.5 kg (146 lb 8 oz)   08/23/16 66.2 kg (146 lb)   06/22/16 69.3 kg (152 lb 11.2 oz)   05/24/16 69.7 kg (153 lb 11.2 oz)   05/02/16 70.4 kg (155 lb 4.8 oz)     Constitutional: Well-appearing female in no acute distress.  Eyes: EOMI, PERRL. No scleral icterus.  ENT: Oral mucosa is moist without lesions or thrush.   Lymphatic: Neck is supple without cervical or supraclavicular lymphadenopathy. No axillary lymphadenopathy.  Breast: Small eschar on left nipple with scant white discharge. No palpable abnormalities in left or right breast. No other skin concerns on either breast.  Cardiovascular: Regular rate and rhythm. No murmurs, gallops, or rubs. No peripheral edema.  Respiratory: Clear to auscultation bilaterally. No wheezes or crackles.  Gastrointestinal: Bowel sounds present. Abdomen soft, non-tender. No palpable hepatosplenomegaly or masses.   Neurologic: Cranial nerves II " through XII are grossly intact.  Skin: See left breast lesion as above. No rashes, petechiae, or bruising noted on exposed skin.    Laboratory Data:  No new laboratory data.      Assessment and Plan:  79-year-old female with history of invasive ductal carcinoma left breast T1c N0, ER positive, FL positive, HER2 negative ( equivocal by FISH, and 1+ by IHC).  Oncotype score was 19 (risk of distant recurrence assuming she takes tamoxifen for 5 years of 12% with tamoxifen alone).      1) Breast cancer: She is s/p adjuvant radiation and now remains on Arimidex and is tolerating this medication well. Mammogram in December 2018 negative.    She does have ongoing concerns with a left breast nipple lesion that was present when she saw Dr. Alves in December but has persisted. As such will get focused US of this area. Will also have her see dermatology for assessment of non-healing skin lesions. No other concerns for disease recurrence on clinical exam today.    Will follow-up with her regarding the results of this work-up. Plan to have her see Dr. Alves back in 6 months for routine follow-up and annual mammogram.    2) Bone health: Underwent DEXA this last month with no signs of osteopenia or osteoporosis. Will continue with DEXA every 2 years. She was counseled regarding continuation of vitamin D and calcium supplementation in the setting of Arimidex use.  Continue exercising.    Greater than 25 min was spent with the patient with >50% of the time spent in counseling and coordinating care.     Amarjit Khan PA-C  [facility]  [address] [place] [address] [id number] [phone]

## 2019-06-03 NOTE — PROGRESS NOTES
Oncology/Hematology Visit Note  Torres 3, 2019    Reason for Visit: Follow up of breast cancer    History of Present Illness: Radhika Williamson is a 79 year old female otherwise healthy female who originally underwent a screening mammography in 12/2015.  This showed an abnormality at the 3 o'clock position and biopsy was recommended.  Biopsy was performed at [facility] on 12/17/2015 showing invasive ductal carcinoma, [place] grade 2.  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  Estrogen was high positivity, progesterone receptor high positivity, and HER2/phuong was negative with a score of 1+.  The patient was away for 6 weeks and then came in for consultation and evaluation by Dr. Luke Salmon on 01/29/2016, at which time they discussed breast conservation.  She had had an MRI at [facility] showing a normal right breast and a 1.6-cm mass at the 3 o'clock position in the left breast.  There were no suspicious lymph nodes.      She went on to have a lumpectomy and sentinel lymph node biopsy of the left breast on 02/24/2016.  The pathology revealed invasive ductal carcinoma, [place] grade 3, measuring 1.7 x 1.7 x 1.6 cm.  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  The surgical margins were negative for invasive cancer or for ductal carcinoma in situ.  This was estrogen receptor positive 90%, progesterone receptor positive 70%,.  HER2 testing was performed on the tumor.  The HER2 testing was performed by FISH revealing HER2 signals per cell of 4.2 which is considered equivocal, a HER2:CEN17 ratio of 1.2 is equivocal, and subsequent probe HER2:SMS ratio of 1.3.  Zero out of 1 lymph node had any evidence of disease, giving her final staging of a T1c N0 left breast cancer.  She did have an Oncotype sent off revealing a score of 19, giving her risk of distant recurrence assuming she takes tamoxifen for 5 years of 12% with tamoxifen alone.       She completed adjuvant radiation under the care of  Dr Grayson on 5/6/2016.  She started on daily arimedex on 5/7/2016. Annual mammogram December 2018 negative. She returns today for ongoing follow-up.     Interval History:  Ms. Williamson returns to clinic today unaccompanied. She overall is feeling well. Her main concern is an ongoing scab on her left nipple. This has been present for many months now and she has mentioned it previously and has been told it wasn't concerning. She denies any pain or changes to the lesion, though she is bothered by it.     She otherwise feels well. She tolerates the Amiridex well with no side effects. She is exercising frequently and taking her vit D/calcium for bone health. GERD well controlled with Zantac BID and PRN omeprazole. No new medical concerns and otherwise review of systems negative.     Review of Systems:  Patient denies fevers, chills, night sweats, unexplained weight changes, headaches, dizziness, vision or hearing changes, new lumps or bumps, chest pain, shortness of breath, cough, abdominal pain, nausea, vomiting, changes to bowel or bladder, swelling of extremities, bleeding issues, or rash.    Current Outpatient Medications   Medication Sig Dispense Refill     anastrozole (ARIMIDEX) 1 MG tablet Take 1 tablet (1 mg) by mouth daily 90 tablet 3     betamethasone, augmented, (DIPROLENE) 0.05 % lotion Apply to scalp 1 time daily       calcium citrate-vitamin D (CITRACAL) 315-250 MG-UNIT TABS Take 2 tablets by mouth daily 500 mg daily       Cholecalciferol (VITAMIN D3 PO) Take 2,000 Units by mouth daily        clobetasol (TEMOVATE) 0.05 % ointment Apply to affected areas on arms and legs twice a day for up to two weeks at a time as needed for severe flares.       desonide (DESOWEN) 0.05 % cream Apply to affected areas on ears and groin twice a day as needed.       fluocinonide (LIDEX) 0.05 % external solution apply to scalp twice daily as needed       hydrocortisone 2.5 % cream Apply a thin layer to affected skin twice a day  "for up to  3-4 weeks face only 1 week       KRILL OIL PO Take 500 mg by mouth daily        MELATONIN PO Take 3 mg by mouth nightly as needed       multivitamin, therapeutic with minerals (THERA-VIT-M) TABS Take 1 tablet by mouth daily       Nutritional Supplements (IMMUNE ENHANCE PO) daily        omeprazole (PRILOSEC) 20 MG CR capsule        Probiotic Product (PROBIOTIC DAILY) CAPS Take 2 capsules by mouth daily       TRAZODONE HCL PO Take 50 mg by mouth At Bedtime       triamcinolone (KENALOG) 0.1 % ointment        Valerian 500 MG CAPS Take by mouth At Bedtime          Physical Examination:  /69   Pulse 64   Temp 98.2  F (36.8  C) (Oral)   Resp 16   Ht 1.727 m (5' 7.99\")   Wt 75.8 kg (167 lb 3.2 oz)   SpO2 99%   BMI 25.43 kg/m    Wt Readings from Last 10 Encounters:   12/03/18 73.7 kg (162 lb 8 oz)   05/25/18 73.7 kg (162 lb 8 oz)   11/27/17 71.5 kg (157 lb 9.6 oz)   05/22/17 68.2 kg (150 lb 4.8 oz)   02/27/17 67.5 kg (148 lb 12.8 oz)   11/28/16 66.5 kg (146 lb 8 oz)   08/23/16 66.2 kg (146 lb)   06/22/16 69.3 kg (152 lb 11.2 oz)   05/24/16 69.7 kg (153 lb 11.2 oz)   05/02/16 70.4 kg (155 lb 4.8 oz)     Constitutional: Well-appearing female in no acute distress.  Eyes: EOMI, PERRL. No scleral icterus.  ENT: Oral mucosa is moist without lesions or thrush.   Lymphatic: Neck is supple without cervical or supraclavicular lymphadenopathy. No axillary lymphadenopathy.  Breast: Small eschar on left nipple with scant white discharge. No palpable abnormalities in left or right breast. No other skin concerns on either breast.  Cardiovascular: Regular rate and rhythm. No murmurs, gallops, or rubs. No peripheral edema.  Respiratory: Clear to auscultation bilaterally. No wheezes or crackles.  Gastrointestinal: Bowel sounds present. Abdomen soft, non-tender. No palpable hepatosplenomegaly or masses.   Neurologic: Cranial nerves II through XII are grossly intact.  Skin: See left breast lesion as above. No rashes, " petechiae, or bruising noted on exposed skin.    Laboratory Data:  No new laboratory data.      Assessment and Plan:  79-year-old female with history of invasive ductal carcinoma left breast T1c N0, ER positive, [place] positive, HER2 negative ( equivocal by FISH, and 1+ by IHC).  Oncotype score was 19 (risk of distant recurrence assuming she takes tamoxifen for 5 years of 12% with tamoxifen alone).      1) Breast cancer: She is s/p adjuvant radiation and now remains on Arimidex and is tolerating this medication well. Mammogram in December 2018 negative.    She does have ongoing concerns with a left breast nipple lesion that was present when she saw Dr. Alves in December but has persisted. As such will get focused US of this area. Will also have her see dermatology for assessment of non-healing skin lesions. No other concerns for disease recurrence on clinical exam today.    Will follow-up with her regarding the results of this work-up. Plan to have her see Dr. Alves back in 6 months for routine follow-up and annual mammogram.    2) Bone health: Underwent DEXA this last month with no signs of osteopenia or osteoporosis. Will continue with DEXA every 2 years. She was counseled regarding continuation of vitamin D and calcium supplementation in the setting of Arimidex use. Continue exercising.    Greater than 25 min was spent with the patient with >50% of the time spent in counseling and coordinating care.     Amarjit Khan PA-C  [facility]  [address] [place] [address]  [phone]

## 2019-06-03 NOTE — LETTER
6/3/2019      RE: Radhika Williamson  [address]  [facility] [address]       Oncology/Hematology Visit Note  Torres 3, 2019    Reason for Visit: Follow up of breast cancer    History of Present Illness: Radhika Williamson is a 79 year old female otherwise healthy female who originally underwent a screening mammography in 12/2015.  This showed an abnormality at the 3 o'clock position and biopsy was recommended.  Biopsy was performed at [facility] on 12/17/2015 showing invasive ductal carcinoma, Randy grade 2.  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  Estrogen was high positivity, progesterone receptor high positivity, and HER2/phuong was negative with a score of 1+.  The patient was away for 6 weeks and then came in for consultation and evaluation by Dr. Luke Salmon on 01/29/2016, at which time they discussed breast conservation.  She had had an MRI at [facility] showing a normal right breast and a 1.6-cm mass at the 3 o'clock position in the left breast.  There were no suspicious lymph nodes.      She went on to have a lumpectomy and sentinel lymph node biopsy of the left breast on 02/24/2016.  The pathology revealed invasive ductal carcinoma, Henderson grade 3, measuring 1.7 x 1.7 x 1.6 cm.  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  The surgical margins were negative for invasive cancer or for ductal carcinoma in situ.  This was estrogen receptor positive 90%, progesterone receptor positive 70%,.  HER2 testing was performed on the tumor.  The HER2 testing was performed by FISH revealing HER2 signals per cell of 4.2 which is considered equivocal, a HER2:CEN17 ratio of 1.2 is equivocal, and subsequent probe HER2:SMS ratio of 1.3.  Zero out of 1 lymph node had any evidence of disease, giving her final staging of a T1c N0 left breast cancer.  She did have an Oncotype sent off revealing a score of 19, giving her risk of distant recurrence assuming she takes tamoxifen for 5 years of 12%  with tamoxifen alone.       She completed adjuvant radiation under the care of Dr Grayson on 5/6/2016.  She started on daily arimedex on 5/7/2016. Annual mammogram December 2018 negative. She returns today for ongoing follow-up.     Interval History:  Ms. Williamson returns to clinic today unaccompanied. She overall is feeling well. Her main concern is an ongoing scab on her left nipple. This has been present for many months now and she has mentioned it previously and has been told it wasn't concerning. She denies any pain or changes to the lesion, though she is bothered by it.     She otherwise feels well. She tolerates the Amiridex well with no side effects. She is exercising frequently and taking her vit D/calcium for bone health. GERD well controlled with Zantac BID and PRN omeprazole. No new medical concerns and otherwise review of systems negative.     Review of Systems:  Patient denies fevers, chills, night sweats, unexplained weight changes, headaches, dizziness, vision or hearing changes, new lumps or bumps, chest pain, shortness of breath, cough, abdominal pain, nausea, vomiting, changes to bowel or bladder, swelling of extremities, bleeding issues, or rash.    Current Outpatient Medications   Medication Sig Dispense Refill     anastrozole (ARIMIDEX) 1 MG tablet Take 1 tablet (1 mg) by mouth daily 90 tablet 3     betamethasone, augmented, (DIPROLENE) 0.05 % lotion Apply to scalp 1 time daily       calcium citrate-vitamin D (CITRACAL) 315-250 MG-UNIT TABS Take 2 tablets by mouth daily 500 mg daily       Cholecalciferol (VITAMIN D3 PO) Take 2,000 Units by mouth daily        clobetasol (TEMOVATE) 0.05 % ointment Apply to affected areas on arms and legs twice a day for up to two weeks at a time as needed for severe flares.       desonide (DESOWEN) 0.05 % cream Apply to affected areas on ears and groin twice a day as needed.       fluocinonide (LIDEX) 0.05 % external solution apply to scalp twice daily as needed    "    hydrocortisone 2.5 % cream Apply a thin layer to affected skin twice a day for up to  3-4 weeks face only 1 week       KRILL OIL PO Take 500 mg by mouth daily        MELATONIN PO Take 3 mg by mouth nightly as needed       multivitamin, therapeutic with minerals (THERA-VIT-M) TABS Take 1 tablet by mouth daily       Nutritional Supplements (IMMUNE ENHANCE PO) daily        omeprazole (PRILOSEC) 20 MG CR capsule        Probiotic Product (PROBIOTIC DAILY) CAPS Take 2 capsules by mouth daily       TRAZODONE HCL PO Take 50 mg by mouth At Bedtime       triamcinolone (KENALOG) 0.1 % ointment        Valerian 500 MG CAPS Take by mouth At Bedtime          Physical Examination:  /69   Pulse 64   Temp 98.2  F (36.8  C) (Oral)   Resp 16   Ht 1.727 m (5' 7.99\")   Wt 75.8 kg (167 lb 3.2 oz)   SpO2 99%   BMI 25.43 kg/m     Wt Readings from Last 10 Encounters:   12/03/18 73.7 kg (162 lb 8 oz)   05/25/18 73.7 kg (162 lb 8 oz)   11/27/17 71.5 kg (157 lb 9.6 oz)   05/22/17 68.2 kg (150 lb 4.8 oz)   02/27/17 67.5 kg (148 lb 12.8 oz)   11/28/16 66.5 kg (146 lb 8 oz)   08/23/16 66.2 kg (146 lb)   06/22/16 69.3 kg (152 lb 11.2 oz)   05/24/16 69.7 kg (153 lb 11.2 oz)   05/02/16 70.4 kg (155 lb 4.8 oz)     Constitutional: Well-appearing female in no acute distress.  Eyes: EOMI, PERRL. No scleral icterus.  ENT: Oral mucosa is moist without lesions or thrush.   Lymphatic: Neck is supple without cervical or supraclavicular lymphadenopathy. No axillary lymphadenopathy.  Breast: Small eschar on left nipple with scant white discharge. No palpable abnormalities in left or right breast. No other skin concerns on either breast.  Cardiovascular: Regular rate and rhythm. No murmurs, gallops, or rubs. No peripheral edema.  Respiratory: Clear to auscultation bilaterally. No wheezes or crackles.  Gastrointestinal: Bowel sounds present. Abdomen soft, non-tender. No palpable hepatosplenomegaly or masses.   Neurologic: Cranial nerves II " through XII are grossly intact.  Skin: See left breast lesion as above. No rashes, petechiae, or bruising noted on exposed skin.    Laboratory Data:  No new laboratory data.      Assessment and Plan:  79-year-old female with history of invasive ductal carcinoma left breast T1c N0, ER positive, MT positive, HER2 negative ( equivocal by FISH, and 1+ by IHC).  Oncotype score was 19 (risk of distant recurrence assuming she takes tamoxifen for 5 years of 12% with tamoxifen alone).      1) Breast cancer: She is s/p adjuvant radiation and now remains on Arimidex and is tolerating this medication well. Mammogram in December 2018 negative.    She does have ongoing concerns with a left breast nipple lesion that was present when she saw Dr. Alves in December but has persisted. As such will get focused US of this area. Will also have her see dermatology for assessment of non-healing skin lesions. No other concerns for disease recurrence on clinical exam today.    Will follow-up with her regarding the results of this work-up. Plan to have her see Dr. Alves back in 6 months for routine follow-up and annual mammogram.    2) Bone health: Underwent DEXA this last month with no signs of osteopenia or osteoporosis. Will continue with DEXA every 2 years. She was counseled regarding continuation of vitamin D and calcium supplementation in the setting of Arimidex use.  Continue exercising.    Greater than 25 min was spent with the patient with >50% of the time spent in counseling and coordinating care.     Amarjit Khan PA-C  [facility]  [address] [place] [id number]  [phone]      HUMBERTO Berman

## 2019-06-03 NOTE — NURSING NOTE
"Oncology Rooming Note    Adrienne 3, 2019 10:43 AM   Radhika Williamson is a 79 year old female who presents for:    Chief Complaint   Patient presents with     Oncology Clinic Visit     RETURN VISIT; BREAST CA FOLLOW UP; VITALS COMPLETED BY [facility]      Initial Vitals: /69   Pulse 64   Temp 98.2  F (36.8  C) (Oral)   Resp 16   Ht 1.727 m (5' 7.99\")   Wt 75.8 kg (167 lb 3.2 oz)   SpO2 99%   BMI 25.43 kg/m   Estimated body mass index is 25.43 kg/m  as calculated from the following:    Height as of this encounter: 1.727 m (5' 7.99\").    Weight as of this encounter: 75.8 kg (167 lb 3.2 oz). Body surface area is 1.91 meters squared.  No Pain (0) Comment: Data Unavailable   No LMP recorded. Patient is postmenopausal.  Allergies reviewed: Yes  Medications reviewed: Yes    Medications: MEDICATION REFILLS NEEDED TODAY. Provider was notified. Patient needs a refill on Arimidex     Pharmacy name entered into EPIC: Data Unavailable    Clinical concerns: Patient complains of a scab on her left breast that has been there for 3 years Amarjit Khan  was notified.      Franchesca Todd              "

## 2019-06-06 ENCOUNTER — ANCILLARY PROCEDURE (OUTPATIENT)
Dept: MAMMOGRAPHY | Facility: CLINIC | Age: 79
End: 2019-06-06
Payer: MEDICARE

## 2019-06-06 DIAGNOSIS — Z17.0 MALIGNANT NEOPLASM OF LEFT BREAST IN FEMALE, ESTROGEN RECEPTOR POSITIVE, UNSPECIFIED SITE OF BREAST (H): ICD-10-CM

## 2019-06-06 DIAGNOSIS — C50.912 MALIGNANT NEOPLASM OF LEFT BREAST IN FEMALE, ESTROGEN RECEPTOR POSITIVE, UNSPECIFIED SITE OF BREAST (H): ICD-10-CM

## 2019-06-06 DIAGNOSIS — L98.9 SKIN LESION: ICD-10-CM

## 2019-06-19 ENCOUNTER — TELEPHONE (OUTPATIENT)
Dept: ONCOLOGY | Facility: CLINIC | Age: 79
End: 2019-06-19

## 2019-07-03 ENCOUNTER — OFFICE VISIT (OUTPATIENT)
Dept: DERMATOLOGY | Facility: CLINIC | Age: 79
End: 2019-07-03
Attending: PHYSICIAN ASSISTANT
Payer: MEDICARE

## 2019-07-03 VITALS — DIASTOLIC BLOOD PRESSURE: 74 MMHG | HEART RATE: 69 BPM | SYSTOLIC BLOOD PRESSURE: 126 MMHG

## 2019-07-03 DIAGNOSIS — L30.9 DERMATITIS: Primary | ICD-10-CM

## 2019-07-03 RX ORDER — DESONIDE 0.5 MG/G
OINTMENT TOPICAL 2 TIMES DAILY
Qty: 60 G | Refills: 3 | Status: SHIPPED | OUTPATIENT
Start: 2019-07-03

## 2019-07-03 ASSESSMENT — PAIN SCALES - GENERAL: PAINLEVEL: NO PAIN (0)

## 2019-07-03 NOTE — PROGRESS NOTES
"[facility] Dermatology Note      Dermatology Problem List:  1. Crusting on the L nipple: currently monitoring; Ddx includes psoriasiform dermatitis vs eczema vs seborrheic keratosis vs Paget's disease   - patient defers biopsy today   - desonide 0.05% ointment BID  2. 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 Hx of breast cancer.         History of Present Illness:  Ms. Radhika Williamson is a 79 year old female who presents as a referral from HUMBERTO Berman, for evaluation of a lesion on her left breast. Of note, the patient has a history of breast cancer. She reports that she has had a lesion on her left breast which developed about 3 years ago described as a \"scab\" that is red and has intermittently grown and diminished in size. She notes that it sometimes peels away and is raw underneath. She had a mammogram of the area performed, but she states that this was unremarkable. She has been told that it could possibly be discharge by another provider. The patient is concerned for the possibility of Paget's Disease. She also notes that she has a history of psoriasiform dermatitis and has and active scaly patch behind her left ear. She has been using multiple topicals for this including desonide, hydrocortisone, and triamcinolone. The patient voices no other concerns.    Past Medical History:   Patient Active Problem List   Diagnosis     Malignant neoplasm of left breast (H)     ACP (advance care planning)     Past Medical History:   Diagnosis Date     Breast cancer (H) 1/2016    left     Sleep apnea     cpap     Past Surgical History:   Procedure Laterality Date     COLONOSCOPY       GYN SURGERY      C section     LUMPECTOMY BREAST WITH SENTINEL NODE, COMBINED Left 2/24/2016    " Procedure: COMBINED LUMPECTOMY BREAST WITH SENTINEL NODE;  Surgeon: Luke Salmon MD;  Location:  OR       Social History:  Patient reports that she has never smoked. She has never used smokeless tobacco. She reports that she drinks alcohol. She reports that she does not use drugs.    Family History:  Family History   Problem Relation Age of Onset     Breast Cancer Maternal Aunt      Cancer Paternal Aunt         pancreatic       Medications:  Current Outpatient Medications   Medication Sig Dispense Refill     anastrozole (ARIMIDEX) 1 MG tablet Take 1 tablet (1 mg) by mouth daily 90 tablet 3     calcium citrate-vitamin D (CITRACAL) 315-250 MG-UNIT TABS Take 2 tablets by mouth daily 500 mg daily       Cholecalciferol (VITAMIN D3 PO) Take 2,000 Units by mouth daily        desonide (DESOWEN) 0.05 % cream Apply to affected areas on ears and groin twice a day as needed.       fluocinonide (LIDEX) 0.05 % external solution apply to scalp twice daily as needed       hydrocortisone 2.5 % cream Apply a thin layer to affected skin twice a day for up to  3-4 weeks face only 1 week       KRILL OIL PO Take 500 mg by mouth daily        melatonin 3 MG CAPS Take 3 mg by mouth as needed       multivitamin, therapeutic with minerals (THERA-VIT-M) TABS Take 1 tablet by mouth daily       Nutritional Supplements (IMMUNE ENHANCE PO) daily        omeprazole (PRILOSEC) 20 MG DR capsule Take 20 mg by mouth as needed       Probiotic Product (PROBIOTIC DAILY) CAPS Take 2 capsules by mouth daily       ranitidine (ZANTAC) 150 MG tablet Take 150 mg by mouth 2 times daily       traZODone (DESYREL) 50 MG tablet Take 100 mg by mouth At Bedtime       triamcinolone (KENALOG) 0.1 % ointment        Valerian 500 MG CAPS Take by mouth At Bedtime          Allergies   Allergen Reactions     Molds & Smuts Other (See Comments)     Other reaction(s): Other, see comments     Dust Mite Extract Other (See Comments)     Other reaction(s): Other, see comments      Dust Mites      Mold        Review of Systems:  -Constitutional: Otherwise feeling well today, in usual state of health.  -HEENT: Patient denies nonhealing oral sores.  -Skin: As above in HPI. No additional skin concerns.    Physical exam:  Vitals: /74 (BP Location: Right arm, Patient Position: Sitting, Cuff Size: Adult Regular)   Pulse 69   GEN: This is a well developed, well-nourished female in no acute distress, in a pleasant mood.    SKIN: Suero phototype: II   Waist-up skin, which includes the head/face, neck, both arms, chest, back, abdomen, digits and/or nails was examined.  -L postauricular sulcus: erythemaotus scaly patch with linear fissuring  -1x2mm yellow/white crust on the L nipple   -No other lesions of concern on areas examined.       Impression/Plan:  1. Crusting on the L nipple: lower suspicion for malignancy given clinical appearance, unremarkable mammogram, and unremarkable ultrasound. Given the history of psoriasiform dermatitis, consider a patch of eczema (eczema on the nipples is very common and can present in this fashion) vs psoriasiform dermatitis. Other diagnoses considered include seborrheic keratosis (although these usually do not wax and wane as the patient reports) vs Paget's disease    Discussed the potential etiologies as well as the risks and benefits of a biopsy for diagnostic purposes vs clinically monitoring the area    The patient elects to monitor the area and will follow-up with any changes    Photos taken today for clinical surveillance     Recommended the use of previously prescribed desonide 0.05% ointment BID    2. 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  Follow-up prn for new or changing lesions.       Staff Involved:  Scribe/Staff    Scribe Disclosure  I, Dominic Najjar, am serving as a scribe  to document services personally performed by Dr. Garrison Toro MD, based on data collection and the provider's statements to me.    Provider Disclosure:   The documentation recorded by the scribe accurately reflects the services I personally performed and the decisions made by me.    Garrison Toro MD   of Dermatology  Department of Dermatology  [facility]

## 2019-07-03 NOTE — LETTER
"7/3/2019       RE: Radhika Williamson  [address] [address]     Dear Colleague,    Thank you for referring your patient, Radhika Williamson, to the [facility] DERMATOLOGY at [facility]. Please see a copy of my visit note below.    [facility] Dermatology Note      Dermatology Problem List:  1. Crusting on the L nipple: currently monitoring; Ddx includes psoriasiform dermatitis vs eczema vs seborrheic keratosis vs Paget's disease   - patient defers biopsy today   - desonide 0.05% ointment BID  2. 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 Hx of breast cancer.         History of Present Illness:  Ms. Radhika Williamson is a 79 year old female who presents as a referral from HUMBERTO Berman, for evaluation of a lesion on her left breast. Of note, the patient has a history of breast cancer. She reports that she has had a lesion on her left breast which developed about 3 years ago described as a \"scab\" that is red and has intermittently grown and diminished in size. She notes that it sometimes peels away and is raw underneath. She had a mammogram of the area performed, but she states that this was unremarkable. She has been told that it could possibly be discharge by another provider. The patient is concerned for the possibility of Paget's Disease. She also notes that she has a history of psoriasiform dermatitis and has and active scaly patch behind her left ear. She has been using multiple topicals for this including desonide, hydrocortisone, and triamcinolone. The patient voices no other concerns.    Past Medical History:   Patient Active Problem List   Diagnosis     Malignant neoplasm of left breast (H)     ACP (advance care planning) "     Past Medical History:   Diagnosis Date     Breast cancer (H) 1/2016    left     Sleep apnea     cpap     Past Surgical History:   Procedure Laterality Date     COLONOSCOPY       GYN SURGERY      C section     LUMPECTOMY BREAST WITH SENTINEL NODE, COMBINED Left 2/24/2016    Procedure: COMBINED LUMPECTOMY BREAST WITH SENTINEL NODE;  Surgeon: Luke Salmon MD;  Location:  OR       Social History:  Patient reports that she has never smoked. She has never used smokeless tobacco. She reports that she drinks alcohol. She reports that she does not use drugs.    Family History:  Family History   Problem Relation Age of Onset     Breast Cancer Maternal Aunt      Cancer Paternal Aunt         pancreatic       Medications:  Current Outpatient Medications   Medication Sig Dispense Refill     anastrozole (ARIMIDEX) 1 MG tablet Take 1 tablet (1 mg) by mouth daily 90 tablet 3     calcium citrate-vitamin D (CITRACAL) 315-250 MG-UNIT TABS Take 2 tablets by mouth daily 500 mg daily       Cholecalciferol (VITAMIN D3 PO) Take 2,000 Units by mouth daily        desonide (DESOWEN) 0.05 % cream Apply to affected areas on ears and groin twice a day as needed.       fluocinonide (LIDEX) 0.05 % external solution apply to scalp twice daily as needed       hydrocortisone 2.5 % cream Apply a thin layer to affected skin twice a day for up to  3-4 weeks face only 1 week       KRILL OIL PO Take 500 mg by mouth daily        melatonin 3 MG CAPS Take 3 mg by mouth as needed       multivitamin, therapeutic with minerals (THERA-VIT-M) TABS Take 1 tablet by mouth daily       Nutritional Supplements (IMMUNE ENHANCE PO) daily        omeprazole (PRILOSEC) 20 MG DR capsule Take 20 mg by mouth as needed       Probiotic Product (PROBIOTIC DAILY) CAPS Take 2 capsules by mouth daily       ranitidine (ZANTAC) 150 MG tablet Take 150 mg by mouth 2 times daily       traZODone (DESYREL) 50 MG tablet Take 100 mg by mouth At Bedtime       triamcinolone (KENALOG)  0.1 % ointment        Valerian 500 MG CAPS Take by mouth At Bedtime          Allergies   Allergen Reactions     Molds & Smuts Other (See Comments)     Other reaction(s): Other, see comments     Dust Mite Extract Other (See Comments)     Other reaction(s): Other, see comments     Dust Mites      Mold        Review of Systems:  -Constitutional: Otherwise feeling well today, in usual state of health.  -HEENT: Patient denies nonhealing oral sores.  -Skin: As above in HPI. No additional skin concerns.    Physical exam:  Vitals: /74 (BP Location: Right arm, Patient Position: Sitting, Cuff Size: Adult Regular)   Pulse 69   GEN: This is a well developed, well-nourished female in no acute distress, in a pleasant mood.    SKIN: Suero phototype: II   Waist-up skin, which includes the head/face, neck, both arms, chest, back, abdomen, digits and/or nails was examined.  -L postauricular sulcus: erythemaotus scaly patch with linear fissuring  -1x2mm yellow/white crust on the L nipple   -No other lesions of concern on areas examined.       Impression/Plan:  1. Crusting on the L nipple: lower suspicion for malignancy given clinical appearance, unremarkable mammogram, and unremarkable ultrasound. Given the history of psoriasiform dermatitis, consider a patch of eczema (eczema on the nipples is very common and can present in this fashion) vs psoriasiform dermatitis. Other diagnoses considered include seborrheic keratosis (although these usually do not wax and wane as the patient reports) vs Paget's disease    Discussed the potential etiologies as well as the risks and benefits of a biopsy for diagnostic purposes vs clinically monitoring the area    The patient elects to monitor the area and will follow-up with any changes    Photos taken today for clinical surveillance     Recommended the use of previously prescribed desonide 0.05% ointment BID    2. 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  Follow-up prn for new or changing lesions.       Staff Involved:  Scribe/Staff    Scribe Disclosure  I, Dominic Najjar, am serving as a scribe to document services personally performed by Dr. Garrison Toro MD, based on data collection and the provider's statements to me.    Provider Disclosure:   The documentation recorded by the scribe accurately reflects the services I personally performed and the decisions made by me.    Garrison Toro MD   of Dermatology  Department of Dermatology  [facility]

## 2020-01-10 NOTE — PROGRESS NOTES
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 Williamson returns to clinic today for follow up of stage I left breast cancer currently on Arimidex since 5/2016.     She otherwise feels well. She tolerates the Amiridex well with no side effects. She is exercising frequently and taking her vit D/calcium for bone health. GERD well controlled with Zantac BID and PRN omeprazole. No new medical concerns and otherwise review of systems negative.     She overall is feeling well. Previously, her main concern was an ongoing scab on her left nipple. This had been present for many months now and she has mentioned it previously and has been told it wasn't concerning. Follow up imaging was unremarkable.  She denies any pain or changes to the lesion, though she is bothered by it.     She recently traveled to [place] and [place].  She is feeling well otherwise with no new medical history or family history.    Review of Systems:  Patient denies fevers, chills, night sweats, unexplained weight changes, headaches, dizziness, vision or hearing changes, new lumps or bumps, chest pain, shortness of breath, cough, abdominal pain, nausea, vomiting, changes to bowel or bladder, swelling of extremities, bleeding issues, or rash.    Current Outpatient Medications   Medication Sig Dispense Refill      anastrozole (ARIMIDEX) 1 MG tablet Take 1 tablet (1 mg) by mouth daily 90 tablet 3     calcium citrate-vitamin D (CITRACAL) 315-250 MG-UNIT TABS Take 2 tablets by mouth daily 500 mg daily       Cholecalciferol (VITAMIN D3 PO) Take 2,000 Units by mouth daily        desonide (DESOWEN) 0.05 % cream Apply to affected areas on ears and groin twice a day as needed.       desonide (DESOWEN) 0.05 % external ointment Apply topically 2 times daily 60 g 3     fluocinonide (LIDEX) 0.05 % external solution apply to scalp twice daily as needed       hydrocortisone 2.5 % cream Apply a thin layer to affected skin twice a day for up to  3-4 weeks face only 1 week       KRILL OIL PO Take 500 mg by mouth daily        melatonin 3 MG CAPS Take 3 mg by mouth as needed       multivitamin, therapeutic with minerals (THERA-VIT-M) TABS Take 1 tablet by mouth daily       Nutritional Supplements (IMMUNE ENHANCE PO) daily        omeprazole (PRILOSEC) 20 MG DR capsule Take 20 mg by mouth as needed       Probiotic Product (PROBIOTIC DAILY) CAPS Take 2 capsules by mouth daily       ranitidine (ZANTAC) 150 MG tablet Take 150 mg by mouth 2 times daily       traZODone (DESYREL) 50 MG tablet Take 100 mg by mouth At Bedtime       triamcinolone (KENALOG) 0.1 % ointment        Valerian 500 MG CAPS Take by mouth At Bedtime          Physical Examination:  There were no vitals taken for this visit.  Wt Readings from Last 10 Encounters:   06/03/19 75.8 kg (167 lb 3.2 oz)   12/03/18 73.7 kg (162 lb 8 oz)   05/25/18 73.7 kg (162 lb 8 oz)   11/27/17 71.5 kg (157 lb 9.6 oz)   05/22/17 68.2 kg (150 lb 4.8 oz)   02/27/17 67.5 kg (148 lb 12.8 oz)   11/28/16 66.5 kg (146 lb 8 oz)   08/23/16 66.2 kg (146 lb)   06/22/16 69.3 kg (152 lb 11.2 oz)   05/24/16 69.7 kg (153 lb 11.2 oz)     Constitutional: Well-appearing female in no acute distress.  Eyes: EOMI, PERRL. No scleral icterus.  ENT: Oral mucosa is moist without lesions or thrush.   Lymphatic: Neck is supple  without cervical or supraclavicular lymphadenopathy. No axillary lymphadenopathy.  Breast: No escar on nipple. No palpable abnormalities in left or right breast. No other skin concerns on either breast.  Cardiovascular: Regular rate and rhythm. No murmurs, gallops, or rubs. No peripheral edema.  Respiratory: Clear to auscultation bilaterally. No wheezes or crackles.  Gastrointestinal: Bowel sounds present. Abdomen soft, non-tender. No palpable hepatosplenomegaly or masses.   Neurologic: Cranial nerves II through XII are grossly intact.  Skin: No rashes, petechiae, or bruising noted on exposed skin.    Laboratory Data:  No new laboratory data.    6/6/19 US breast  Findings:     Mammogram:  Changes of breast conserving therapy in the left breast. No  significant change. No suspicious finding associated with the nipple  or subareolar left breast.     Ultrasound:  Targeted, real-time ultrasound evaluation was performed by the  technologist and radiologist.  No suspicious finding associated with the nipple or subareolar left  breast. On physical exam there is mild redness of the left nipple  compared to the right.                                                                      Impression: BI-RADS CATEGORY: 2 - Benign Finding(s)    Mammogram - reviewed personally by me with radiology.  Unremarkable.    Assessment and Plan:  79-year-old female with history of invasive ductal carcinoma left breast T1c N0, ER positive, RI positive, HER2 negative (equivocal by FISH, and 1+ by IHC).  Oncotype score was 19 (risk of distant recurrence assuming she takes tamoxifen for 5 years of 12% with tamoxifen alone). Underwent lumpectomy w/ negative SLND followed by XRT. Currently on Arimidex since 5/2016.      Diagnostic mammography and ultrasound in 6/2019 (done for left breast nipple lesion): benign.  No other concerns for disease recurrence on clinical exam today or by imaging.    Plan to have her back in 6 months for routine  follow-up    Bone health:   Underwent DEXA 5/2019 with no signs of osteopenia or osteoporosis.   Will continue with DEXA every 2 years. She was counseled regarding continuation of vitamin D and calcium supplementation in the setting of Arimidex use. Continue exercising.    HTN - BP elevated today.  Asked to have this rechecked at home.  If remains elevated, she will follow up with her PCP.    Cindy Alves MD

## 2020-01-13 ENCOUNTER — ANCILLARY PROCEDURE (OUTPATIENT)
Dept: MAMMOGRAPHY | Facility: CLINIC | Age: 80
End: 2020-01-13
Payer: MEDICARE

## 2020-01-13 ENCOUNTER — ONCOLOGY VISIT (OUTPATIENT)
Dept: ONCOLOGY | Facility: CLINIC | Age: 80
End: 2020-01-13
Attending: INTERNAL MEDICINE
Payer: MEDICARE

## 2020-01-13 VITALS
RESPIRATION RATE: 16 BRPM | DIASTOLIC BLOOD PRESSURE: 77 MMHG | SYSTOLIC BLOOD PRESSURE: 153 MMHG | OXYGEN SATURATION: 98 % | HEIGHT: 68 IN | WEIGHT: 164 LBS | BODY MASS INDEX: 24.86 KG/M2 | TEMPERATURE: 98 F | HEART RATE: 68 BPM

## 2020-01-13 DIAGNOSIS — C50.912 MALIGNANT NEOPLASM OF LEFT BREAST IN FEMALE, ESTROGEN RECEPTOR POSITIVE, UNSPECIFIED SITE OF BREAST (H): Primary | ICD-10-CM

## 2020-01-13 DIAGNOSIS — Z17.0 MALIGNANT NEOPLASM OF LEFT BREAST IN FEMALE, ESTROGEN RECEPTOR POSITIVE, UNSPECIFIED SITE OF BREAST (H): Primary | ICD-10-CM

## 2020-01-13 DIAGNOSIS — C50.912 MALIGNANT NEOPLASM OF LEFT BREAST IN FEMALE, ESTROGEN RECEPTOR POSITIVE, UNSPECIFIED SITE OF BREAST (H): ICD-10-CM

## 2020-01-13 DIAGNOSIS — Z17.0 MALIGNANT NEOPLASM OF LEFT BREAST IN FEMALE, ESTROGEN RECEPTOR POSITIVE, UNSPECIFIED SITE OF BREAST (H): ICD-10-CM

## 2020-01-13 DIAGNOSIS — Z12.31 BREAST CANCER SCREENING BY MAMMOGRAM: ICD-10-CM

## 2020-01-13 PROCEDURE — G0463 HOSPITAL OUTPT CLINIC VISIT: HCPCS | Mod: ZF

## 2020-01-13 PROCEDURE — 99214 OFFICE O/P EST MOD 30 MIN: CPT | Mod: ZP | Performed by: INTERNAL MEDICINE

## 2020-01-13 ASSESSMENT — PAIN SCALES - GENERAL: PAINLEVEL: NO PAIN (0)

## 2020-01-13 ASSESSMENT — MIFFLIN-ST. JEOR: SCORE: 1277.24

## 2020-01-13 NOTE — LETTER
1/13/2020       RE: Radhika Williamson  [address]  [place] [address]     Dear Colleague,    Thank you for referring your patient, Radhika Williamson, to the [facility]. Please see a copy of my visit note below.    Oncology/Hematology Visit Note  Jan 13, 2020    Reason for Visit: Follow up of breast cancer    Oncologic history summary:  Oncologic diagnosis and prognostic factors:  Postmenopausal, left breast invasive ductal carcinoma, ER/[place]+ HER2 negative, stage IA, Dx in 12/2015  - Abnormal screening mammography in 12/2015, Bx confirmed invasive ductal carcinoma, grade 2, associated w/ DCIS, ER/KS+, HER2 negative  - Lumpectomy w/ SLND on 12/24/16; invasive ductal carcinoma, grade 3, 1.7x1.7x1.6 cm, margin negative, ER+ 90%, KS+ 70%, HER2 negative by IHC, negative LN; pT1cN0  - Complete adjuvant radiation   - Oncotype Dx score 19    Treatment:  Arimidex started 5/7/16-    Interval History:  Ms. Williamson returns to clinic today for follow up of stage I left breast cancer currently on Arimidex since 5/2016.     She otherwise feels well. She tolerates the Amiridex well with no side effects. She is exercising frequently and taking her vit D/calcium for bone health. GERD well controlled with Zantac BID and PRN omeprazole. No new medical concerns and otherwise review of systems negative.     She overall is feeling well. Previously, her main concern was an ongoing scab on her left nipple. This had been present for many months now and she has mentioned it previously and has been told it wasn't concerning. Follow up imaging was unremarkable.  She denies any pain or changes to the lesion, though she is bothered by it.     She recently traveled to [place] and [place].  She is feeling well otherwise with no new medical history or family history.    Review of Systems:  Patient denies fevers, chills, night sweats, unexplained weight changes, headaches, dizziness, vision or hearing changes, new lumps or  bumps, chest pain, shortness of breath, cough, abdominal pain, nausea, vomiting, changes to bowel or bladder, swelling of extremities, bleeding issues, or rash.    Current Outpatient Medications   Medication Sig Dispense Refill     anastrozole (ARIMIDEX) 1 MG tablet Take 1 tablet (1 mg) by mouth daily 90 tablet 3     calcium citrate-vitamin D (CITRACAL) 315-250 MG-UNIT TABS Take 2 tablets by mouth daily 500 mg daily       Cholecalciferol (VITAMIN D3 PO) Take 2,000 Units by mouth daily        desonide (DESOWEN) 0.05 % cream Apply to affected areas on ears and groin twice a day as needed.       desonide (DESOWEN) 0.05 % external ointment Apply topically 2 times daily 60 g 3     fluocinonide (LIDEX) 0.05 % external solution apply to scalp twice daily as needed       hydrocortisone 2.5 % cream Apply a thin layer to affected skin twice a day for up to  3-4 weeks face only 1 week       KRILL OIL PO Take 500 mg by mouth daily        melatonin 3 MG CAPS Take 3 mg by mouth as needed       multivitamin, therapeutic with minerals (THERA-VIT-M) TABS Take 1 tablet by mouth daily       Nutritional Supplements (IMMUNE ENHANCE PO) daily        omeprazole (PRILOSEC) 20 MG DR capsule Take 20 mg by mouth as needed       Probiotic Product (PROBIOTIC DAILY) CAPS Take 2 capsules by mouth daily       ranitidine (ZANTAC) 150 MG tablet Take 150 mg by mouth 2 times daily       traZODone (DESYREL) 50 MG tablet Take 100 mg by mouth At Bedtime       triamcinolone (KENALOG) 0.1 % ointment        Valerian 500 MG CAPS Take by mouth At Bedtime          Physical Examination:  There were no vitals taken for this visit.  Wt Readings from Last 10 Encounters:   06/03/19 75.8 kg (167 lb 3.2 oz)   12/03/18 73.7 kg (162 lb 8 oz)   05/25/18 73.7 kg (162 lb 8 oz)   11/27/17 71.5 kg (157 lb 9.6 oz)   05/22/17 68.2 kg (150 lb 4.8 oz)   02/27/17 67.5 kg (148 lb 12.8 oz)   11/28/16 66.5 kg (146 lb 8 oz)   08/23/16 66.2 kg (146 lb)   06/22/16 69.3 kg (152 lb 11.2  oz)   05/24/16 69.7 kg (153 lb 11.2 oz)     Constitutional: Well-appearing female in no acute distress.  Eyes: EOMI, PERRL. No scleral icterus.  ENT: Oral mucosa is moist without lesions or thrush.   Lymphatic: Neck is supple without cervical or supraclavicular lymphadenopathy. No axillary lymphadenopathy.  Breast: No escar on nipple. No palpable abnormalities in left or right breast. No other skin concerns on either breast.  Cardiovascular: Regular rate and rhythm. No murmurs, gallops, or rubs. No peripheral edema.  Respiratory: Clear to auscultation bilaterally. No wheezes or crackles.  Gastrointestinal: Bowel sounds present. Abdomen soft, non-tender. No palpable hepatosplenomegaly or masses.   Neurologic: Cranial nerves II through XII are grossly intact.  Skin: No rashes, petechiae, or bruising noted on exposed skin.    Laboratory Data:  No new laboratory data.    6/6/19 US breast  Findings:     Mammogram:  Changes of breast conserving therapy in the left breast. No  significant change. No suspicious finding associated with the nipple  or subareolar left breast.     Ultrasound:  Targeted, real-time ultrasound evaluation was performed by the  technologist and radiologist.  No suspicious finding associated with the nipple or subareolar left  breast. On physical exam there is mild redness of the left nipple  compared to the right.                                                                      Impression: BI-RADS CATEGORY: 2 - Benign Finding(s)    Mammogram - reviewed personally by me with radiology.  Unremarkable.    Assessment and Plan:  79-year-old female with history of invasive ductal carcinoma left breast T1c N0, ER positive, DE positive, HER2 negative (equivocal by FISH, and 1+ by IHC).  Oncotype score was 19 (risk of distant recurrence assuming she takes tamoxifen for 5 years of 12% with tamoxifen alone). Underwent lumpectomy w/ negative SLND followed by XRT. Currently on Arimidex since 5/2016.       Diagnostic mammography and ultrasound in 6/2019 (done for left breast nipple lesion): benign.  No other concerns for disease recurrence on clinical exam today or by imaging.    Plan to have her back in 6 months for routine follow-up    Bone health:   Underwent DEXA 5/2019 with no signs of osteopenia or osteoporosis.   Will continue with DEXA every 2 years. She was counseled regarding continuation of vitamin D and calcium supplementation in the setting of Arimidex use. Continue exercising.    HTN - BP elevated today.  Asked to have this rechecked at home.  If remains elevated, she will follow up with her PCP.    Cindy Alves MD

## 2020-01-13 NOTE — NURSING NOTE
"Oncology Rooming Note    January 13, 2020 9:53 AM   Radhika Williamson is a 79 year old female who presents for:    Chief Complaint   Patient presents with     Oncology Clinic Visit     RETURN VISIT; BREAST CANCER; VITALS TAKEN      Initial Vitals: BP (!) 153/77   Pulse 68   Temp 98  F (36.7  C) (Oral)   Resp 16   Ht 1.727 m (5' 7.99\")   Wt 74.4 kg (164 lb)   SpO2 98%   Breastfeeding No   BMI 24.94 kg/m   Estimated body mass index is 24.94 kg/m  as calculated from the following:    Height as of this encounter: 1.727 m (5' 7.99\").    Weight as of this encounter: 74.4 kg (164 lb). Body surface area is 1.89 meters squared.  No Pain (0) Comment: Data Unavailable   No LMP recorded. Patient is postmenopausal.  Allergies reviewed: Yes  Medications reviewed: Yes    Medications: Medication refills not needed today.  Pharmacy name entered into EPIC: Data Unavailable    Clinical concerns: No new concerns today  Dr Alves was notified.      Franchesca Tubbs              "

## 2020-03-10 ENCOUNTER — HEALTH MAINTENANCE LETTER (OUTPATIENT)
Age: 80
End: 2020-03-10

## 2020-06-08 DIAGNOSIS — Z17.0 MALIGNANT NEOPLASM OF LOWER-OUTER QUADRANT OF LEFT BREAST OF FEMALE, ESTROGEN RECEPTOR POSITIVE (H): ICD-10-CM

## 2020-06-08 DIAGNOSIS — C50.512 MALIGNANT NEOPLASM OF LOWER-OUTER QUADRANT OF LEFT BREAST OF FEMALE, ESTROGEN RECEPTOR POSITIVE (H): ICD-10-CM

## 2020-06-08 RX ORDER — ANASTROZOLE 1 MG/1
1 TABLET ORAL DAILY
Qty: 90 TABLET | Refills: 3 | Status: SHIPPED | OUTPATIENT
Start: 2020-06-08 | End: 2020-06-09

## 2020-06-09 DIAGNOSIS — C50.512 MALIGNANT NEOPLASM OF LOWER-OUTER QUADRANT OF LEFT BREAST OF FEMALE, ESTROGEN RECEPTOR POSITIVE (H): ICD-10-CM

## 2020-06-09 DIAGNOSIS — Z17.0 MALIGNANT NEOPLASM OF LOWER-OUTER QUADRANT OF LEFT BREAST OF FEMALE, ESTROGEN RECEPTOR POSITIVE (H): ICD-10-CM

## 2020-06-09 RX ORDER — ANASTROZOLE 1 MG/1
1 TABLET ORAL DAILY
Qty: 90 TABLET | Refills: 3 | Status: SHIPPED | OUTPATIENT
Start: 2020-06-09 | End: 2021-05-04

## 2020-06-19 ENCOUNTER — ONCOLOGY VISIT (OUTPATIENT)
Dept: ONCOLOGY | Facility: CLINIC | Age: 80
End: 2020-06-19
Attending: PHYSICIAN ASSISTANT
Payer: MEDICARE

## 2020-06-19 VITALS
BODY MASS INDEX: 23.98 KG/M2 | WEIGHT: 158.2 LBS | DIASTOLIC BLOOD PRESSURE: 76 MMHG | OXYGEN SATURATION: 100 % | HEIGHT: 68 IN | HEART RATE: 66 BPM | SYSTOLIC BLOOD PRESSURE: 135 MMHG | RESPIRATION RATE: 16 BRPM | TEMPERATURE: 98 F

## 2020-06-19 DIAGNOSIS — Z12.31 ENCOUNTER FOR SCREENING MAMMOGRAM FOR BREAST CANCER: Primary | ICD-10-CM

## 2020-06-19 PROCEDURE — G0463 HOSPITAL OUTPT CLINIC VISIT: HCPCS | Mod: ZF

## 2020-06-19 PROCEDURE — 99213 OFFICE O/P EST LOW 20 MIN: CPT | Mod: ZP | Performed by: PHYSICIAN ASSISTANT

## 2020-06-19 RX ORDER — FAMOTIDINE 20 MG/1
20 TABLET, FILM COATED ORAL 2 TIMES DAILY
COMMUNITY
Start: 2020-04-09

## 2020-06-19 ASSESSMENT — PAIN SCALES - GENERAL: PAINLEVEL: NO PAIN (0)

## 2020-06-19 ASSESSMENT — MIFFLIN-ST. JEOR: SCORE: 1245.93

## 2020-06-19 NOTE — NURSING NOTE
"Oncology Rooming Note    June 19, 2020 11:55 AM   Radhika Williamson is a 80 year old female who presents for:    Chief Complaint   Patient presents with     Oncology Clinic Visit     RETURN VISIT; BREAST CANCER; VITALS TAKEN      Initial Vitals: /76   Pulse 66   Temp 98  F (36.7  C) (Oral)   Resp 16   Ht 1.727 m (5' 7.99\")   Wt 71.8 kg (158 lb 3.2 oz)   SpO2 100%   BMI 24.06 kg/m   Estimated body mass index is 24.06 kg/m  as calculated from the following:    Height as of this encounter: 1.727 m (5' 7.99\").    Weight as of this encounter: 71.8 kg (158 lb 3.2 oz). Body surface area is 1.86 meters squared.  No Pain (0) Comment: Data Unavailable   No LMP recorded. Patient is postmenopausal.  Allergies reviewed: Yes  Medications reviewed: Yes    Medications: Medication refills not needed today.  Pharmacy name entered into [employer]: [id number] - [place], [place] - [address] AT [facility] [address]    Clinical concerns: No new concerns today  Debra Rowe  was notified.      Franchesca Tubbs              "

## 2020-06-19 NOTE — LETTER
6/19/2020         RE: Radhika Williamson  [address]  [place] [address]      Oncology/Hematology Visit Note  Jun 19, 2020    Reason for Visit: Follow up of breast cancer    History of Present Illness: Radhika Williamson is a 80 year old female otherwise healthy female who originally underwent a screening mammography in 12/2015.  This showed an abnormality at the 3 o'clock position and biopsy was recommended.  Biopsy was performed at [facility] on 12/17/2015 showing invasive ductal carcinoma, Randy grade 2.  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  Estrogen was high positivity, progesterone receptor high positivity, and HER2/phuong was negative with a score of 1+.  The patient was away for 6 weeks and then came in for consultation and evaluation by Dr. Luke Salmon on 01/29/2016, at which time they discussed breast conservation.  She had had an MRI at [facility] showing a normal right breast and a 1.6-cm mass at the 3 o'clock position in the left breast.  There were no suspicious lymph nodes.      She went on to have a lumpectomy and sentinel lymph node biopsy of the left breast on 02/24/2016.  The pathology revealed invasive ductal carcinoma, [place] grade 3, measuring 1.7 x 1.7 x 1.6 cm.  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  The surgical margins were negative for invasive cancer or for ductal carcinoma in situ.  This was estrogen receptor positive 90%, progesterone receptor positive 70%,.  HER2 testing was performed on the tumor.  The HER2 testing was performed by FISH revealing HER2 signals per cell of 4.2 which is considered equivocal, a HER2:CEN17 ratio of 1.2 is equivocal, and subsequent probe HER2:SMS ratio of 1.3.  Zero out of 1 lymph node had any evidence of disease, giving her final staging of a T1c N0 left breast cancer.  She did have an Oncotype sent off revealing a score of 19, giving her risk of distant recurrence assuming she takes tamoxifen for 5 years  of 12% with tamoxifen alone.       She completed adjuvant radiation under the care of Dr Grayson on 5/6/2016.  She started on daily arimedex on 5/7/2016. Annual mammogram January 2020 negative. She returns today for ongoing follow-up.     Interval History:  Ms. Williamson returns to clinic today and feels well. She has no concerns today. She has been coping okay in the pandemic. Her and her  have been isolating and socially connecting on zoom or facetime. She wonders if she is immunosuppressed from her diagnosis. She has continued on arimidex and tolerates this well. No hot flashes or arthralgias. No breast concerns, lumps/bumps. She continues to exercise a few times per week on zoom with her senior class at the  both aerobic classes and yoga. Had a high fever and was diagnosed with a UTI end of March. No infectious symptoms or fevers since. She has not had any nipple concerns since the w/u last summer. Discharge resolved.     Review of Systems:  Patient denies fevers, chills, night sweats, unexplained weight changes, headaches, dizziness, vision or hearing changes, new lumps or bumps, chest pain, shortness of breath, cough, abdominal pain, nausea, vomiting, changes to bowel or bladder, swelling of extremities, bleeding issues, or rash.    Current Outpatient Medications   Medication Sig Dispense Refill     anastrozole (ARIMIDEX) 1 MG tablet Take 1 tablet (1 mg) by mouth daily 90 tablet 3     calcium citrate-vitamin D (CITRACAL) 315-250 MG-UNIT TABS Take 2 tablets by mouth daily 500 mg daily       Cholecalciferol (VITAMIN D3 PO) Take 2,000 Units by mouth daily        desonide (DESOWEN) 0.05 % cream Apply to affected areas on ears and groin twice a day as needed.       desonide (DESOWEN) 0.05 % external ointment Apply topically 2 times daily 60 g 3     famotidine (PEPCID) 20 MG tablet Take 20 mg by mouth 2 times daily       fluocinonide (LIDEX) 0.05 % external solution apply to scalp twice daily as needed        "hydrocortisone 2.5 % cream Apply a thin layer to affected skin twice a day for up to  3-4 weeks face only 1 week       melatonin 3 MG CAPS Take 3 mg by mouth as needed       Nutritional Supplements (IMMUNE ENHANCE PO) daily        omeprazole (PRILOSEC) 20 MG DR capsule Take 20 mg by mouth as needed       traZODone (DESYREL) 50 MG tablet Take 100 mg by mouth At Bedtime       triamcinolone (KENALOG) 0.1 % ointment        multivitamin, therapeutic with minerals (THERA-VIT-M) TABS Take 1 tablet by mouth daily       Valerian 500 MG CAPS Take by mouth At Bedtime          Physical Examination:  /76   Pulse 66   Temp 98  F (36.7  C) (Oral)   Resp 16   Ht 1.727 m (5' 7.99\")   Wt 71.8 kg (158 lb 3.2 oz)   SpO2 100%   BMI 24.06 kg/m    Wt Readings from Last 10 Encounters:   06/19/20 71.8 kg (158 lb 3.2 oz)   01/13/20 74.4 kg (164 lb)   06/03/19 75.8 kg (167 lb 3.2 oz)   12/03/18 73.7 kg (162 lb 8 oz)   05/25/18 73.7 kg (162 lb 8 oz)   11/27/17 71.5 kg (157 lb 9.6 oz)   05/22/17 68.2 kg (150 lb 4.8 oz)   02/27/17 67.5 kg (148 lb 12.8 oz)   11/28/16 66.5 kg (146 lb 8 oz)   08/23/16 66.2 kg (146 lb)     Constitutional: Well-appearing female in no acute distress.  Eyes: EOMI, PERRL. No scleral icterus.  Lymphatic: Neck is supple without cervical or supraclavicular lymphadenopathy. No axillary lymphadenopathy.  Breast: Left nipple benign. No palpable abnormalities in left or right breast. No other skin concerns on either breast.  Cardiovascular: Regular rate and rhythm. No murmurs, gallops, or rubs. No peripheral edema.  Respiratory: Clear to auscultation bilaterally. No wheezes or crackles.  Gastrointestinal: Bowel sounds present. Abdomen soft, non-tender. No palpable hepatosplenomegaly or masses.   Neurologic: Cranial nerves II through XII are grossly intact.  Skin: No rashes, petechiae, or bruising noted on exposed skin.    Assessment and Plan:  80-year-old female with history of invasive ductal carcinoma left " breast T1c N0, ER positive, MT positive, HER2 negative ( equivocal by FISH, and 1+ by IHC). Oncotype score was 19 (risk of distant recurrence assuming she takes tamoxifen for 5 years of 12% with tamoxifen alone).      1) Breast cancer: She is s/p adjuvant radiation and now remains on Arimidex and is tolerating this medication well. Mammogram in January 2020 was benign. Plan for at least 5 year course of arimidex. She will discuss further with Dr. Alves next visit. Will schedule her mammo and follow-up in 6 months but then annual follow-up is fine since she will be over 5 years out at that time.     2) Bone health: DEXA 5/2019 was normal.  Will continue with DEXA every 2 years. Continue vitamin D and calcium and weight bearing exercise.     Debra Rowe PA-C   [facility]  [address] [place] [id number]  [phone]        Debra Rowe PA-C

## 2020-06-19 NOTE — PROGRESS NOTES
Oncology/Hematology Visit Note  Jun 19, 2020    Reason for Visit: Follow up of breast cancer    History of Present Illness: Radhika Williamson is a 80 year old female otherwise healthy female who originally underwent a screening mammography in 12/2015.  This showed an abnormality at the 3 o'clock position and biopsy was recommended.  Biopsy was performed at [facility] on 12/17/2015 showing invasive ductal carcinoma, [place] grade 2.  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  Estrogen was high positivity, progesterone receptor high positivity, and HER2/phuong was negative with a score of 1+.  The patient was away for 6 weeks and then came in for consultation and evaluation by Dr. Luke Salmon on 01/29/2016, at which time they discussed breast conservation.  She had had an MRI at [facility] showing a normal right breast and a 1.6-cm mass at the 3 o'clock position in the left breast.  There were no suspicious lymph nodes.      She went on to have a lumpectomy and sentinel lymph node biopsy of the left breast on 02/24/2016.  The pathology revealed invasive ductal carcinoma, [place] grade 3, measuring 1.7 x 1.7 x 1.6 cm.  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  The surgical margins were negative for invasive cancer or for ductal carcinoma in situ.  This was estrogen receptor positive 90%, progesterone receptor positive 70%,.  HER2 testing was performed on the tumor.  The HER2 testing was performed by FISH revealing HER2 signals per cell of 4.2 which is considered equivocal, a HER2:CEN17 ratio of 1.2 is equivocal, and subsequent probe HER2:SMS ratio of 1.3.  Zero out of 1 lymph node had any evidence of disease, giving her final staging of a T1c N0 left breast cancer.  She did have an Oncotype sent off revealing a score of 19, giving her risk of distant recurrence assuming she takes tamoxifen for 5 years of 12% with tamoxifen alone.       She completed adjuvant radiation under the care of  Dr Grayson on 5/6/2016.  She started on daily arimedex on 5/7/2016. Annual mammogram January 2020 negative. She returns today for ongoing follow-up.     Interval History:  Ms. Williamson returns to clinic today and feels well. She has no concerns today. She has been coping okay in the pandemic. Her and her  have been isolating and socially connecting on zoom or facetime. She wonders if she is immunosuppressed from her diagnosis. She has continued on arimidex and tolerates this well. No hot flashes or arthralgias. No breast concerns, lumps/bumps. She continues to exercise a few times per week on zoom with [facility] [employer] both aerobic classes and yoga. Had a high fever and was diagnosed with a UTI end of March. No infectious symptoms or fevers since. She has not had any nipple concerns since the w/u last summer. Discharge resolved.     Review of Systems:  Patient denies fevers, chills, night sweats, unexplained weight changes, headaches, dizziness, vision or hearing changes, new lumps or bumps, chest pain, shortness of breath, cough, abdominal pain, nausea, vomiting, changes to bowel or bladder, swelling of extremities, bleeding issues, or rash.    Current Outpatient Medications   Medication Sig Dispense Refill     anastrozole (ARIMIDEX) 1 MG tablet Take 1 tablet (1 mg) by mouth daily 90 tablet 3     calcium citrate-vitamin D (CITRACAL) 315-250 MG-UNIT TABS Take 2 tablets by mouth daily 500 mg daily       Cholecalciferol (VITAMIN D3 PO) Take 2,000 Units by mouth daily        desonide (DESOWEN) 0.05 % cream Apply to affected areas on ears and groin twice a day as needed.       desonide (DESOWEN) 0.05 % external ointment Apply topically 2 times daily 60 g 3     famotidine (PEPCID) 20 MG tablet Take 20 mg by mouth 2 times daily       fluocinonide (LIDEX) 0.05 % external solution apply to scalp twice daily as needed       hydrocortisone 2.5 % cream Apply a thin layer to affected skin twice a day for up to   "3-4 weeks face only 1 week       melatonin 3 MG CAPS Take 3 mg by mouth as needed       Nutritional Supplements (IMMUNE ENHANCE PO) daily        omeprazole (PRILOSEC) 20 MG DR capsule Take 20 mg by mouth as needed       traZODone (DESYREL) 50 MG tablet Take 100 mg by mouth At Bedtime       triamcinolone (KENALOG) 0.1 % ointment        multivitamin, therapeutic with minerals (THERA-VIT-M) TABS Take 1 tablet by mouth daily       Valerian 500 MG CAPS Take by mouth At Bedtime          Physical Examination:  /76   Pulse 66   Temp 98  F (36.7  C) (Oral)   Resp 16   Ht 1.727 m (5' 7.99\")   Wt 71.8 kg (158 lb 3.2 oz)   SpO2 100%   BMI 24.06 kg/m    Wt Readings from Last 10 Encounters:   06/19/20 71.8 kg (158 lb 3.2 oz)   01/13/20 74.4 kg (164 lb)   06/03/19 75.8 kg (167 lb 3.2 oz)   12/03/18 73.7 kg (162 lb 8 oz)   05/25/18 73.7 kg (162 lb 8 oz)   11/27/17 71.5 kg (157 lb 9.6 oz)   05/22/17 68.2 kg (150 lb 4.8 oz)   02/27/17 67.5 kg (148 lb 12.8 oz)   11/28/16 66.5 kg (146 lb 8 oz)   08/23/16 66.2 kg (146 lb)     Constitutional: Well-appearing female in no acute distress.  Eyes: EOMI, PERRL. No scleral icterus.  Lymphatic: Neck is supple without cervical or supraclavicular lymphadenopathy. No axillary lymphadenopathy.  Breast: Left nipple benign. No palpable abnormalities in left or right breast. No other skin concerns on either breast.  Cardiovascular: Regular rate and rhythm. No murmurs, gallops, or rubs. No peripheral edema.  Respiratory: Clear to auscultation bilaterally. No wheezes or crackles.  Gastrointestinal: Bowel sounds present. Abdomen soft, non-tender. No palpable hepatosplenomegaly or masses.   Neurologic: Cranial nerves II through XII are grossly intact.  Skin: No rashes, petechiae, or bruising noted on exposed skin.    Assessment and Plan:  80-year-old female with history of invasive ductal carcinoma left breast T1c N0, ER positive, IA positive, HER2 negative ( equivocal by FISH, and 1+ by IHC). " Oncotype score was 19 (risk of distant recurrence assuming she takes tamoxifen for 5 years of 12% with tamoxifen alone).      1) Breast cancer: She is s/p adjuvant radiation and now remains on Arimidex and is tolerating this medication well. Mammogram in January 2020 was benign. Plan for at least 5 year course of arimidex. She will discuss further with Dr. Alves next visit. Will schedule her mammo and follow-up in 6 months but then annual follow-up is fine since she will be over 5 years out at that time.     2) Bone health: DEXA 5/2019 was normal.  Will continue with DEXA every 2 years. Continue vitamin D and calcium and weight bearing exercise.     Debra Rowe PA-C   [facility]  [address], [place] [id number] [phone]

## 2021-01-08 ENCOUNTER — HOSPITAL ENCOUNTER (OUTPATIENT)
Dept: RESEARCH | Facility: CLINIC | Age: 81
End: 2021-01-08
Attending: INTERNAL MEDICINE
Payer: COMMERCIAL

## 2021-02-01 ENCOUNTER — ONCOLOGY VISIT (OUTPATIENT)
Dept: ONCOLOGY | Facility: CLINIC | Age: 81
End: 2021-02-01
Attending: INTERNAL MEDICINE
Payer: COMMERCIAL

## 2021-02-01 ENCOUNTER — ANCILLARY PROCEDURE (OUTPATIENT)
Dept: MAMMOGRAPHY | Facility: CLINIC | Age: 81
End: 2021-02-01
Attending: PHYSICIAN ASSISTANT
Payer: COMMERCIAL

## 2021-02-01 VITALS
DIASTOLIC BLOOD PRESSURE: 78 MMHG | RESPIRATION RATE: 18 BRPM | OXYGEN SATURATION: 99 % | SYSTOLIC BLOOD PRESSURE: 135 MMHG | BODY MASS INDEX: 24.34 KG/M2 | WEIGHT: 160 LBS | TEMPERATURE: 98 F | HEART RATE: 65 BPM

## 2021-02-01 DIAGNOSIS — Z13.820 SCREENING FOR OSTEOPOROSIS: ICD-10-CM

## 2021-02-01 DIAGNOSIS — Z79.811 LONG TERM (CURRENT) USE OF AROMATASE INHIBITORS: ICD-10-CM

## 2021-02-01 DIAGNOSIS — C50.512 MALIGNANT NEOPLASM OF LOWER-OUTER QUADRANT OF LEFT BREAST OF FEMALE, ESTROGEN RECEPTOR POSITIVE (H): Primary | ICD-10-CM

## 2021-02-01 DIAGNOSIS — Z17.0 MALIGNANT NEOPLASM OF LOWER-OUTER QUADRANT OF LEFT BREAST OF FEMALE, ESTROGEN RECEPTOR POSITIVE (H): Primary | ICD-10-CM

## 2021-02-01 DIAGNOSIS — Z12.31 ENCOUNTER FOR SCREENING MAMMOGRAM FOR BREAST CANCER: ICD-10-CM

## 2021-02-01 PROCEDURE — 77063 BREAST TOMOSYNTHESIS BI: CPT | Mod: GC | Performed by: RADIOLOGY

## 2021-02-01 PROCEDURE — G0463 HOSPITAL OUTPT CLINIC VISIT: HCPCS

## 2021-02-01 PROCEDURE — 99214 OFFICE O/P EST MOD 30 MIN: CPT | Performed by: INTERNAL MEDICINE

## 2021-02-01 PROCEDURE — 77067 SCR MAMMO BI INCL CAD: CPT | Mod: GC | Performed by: RADIOLOGY

## 2021-02-01 RX ORDER — KETOCONAZOLE 20 MG/ML
SHAMPOO TOPICAL
COMMUNITY
Start: 2020-09-17

## 2021-02-01 ASSESSMENT — PAIN SCALES - GENERAL: PAINLEVEL: NO PAIN (0)

## 2021-02-01 NOTE — LETTER
2/1/2021         RE: Radhika Williamson  [address]  [place] [address]        Dear Colleague,    Thank you for referring your patient, Radhika Williamson, to the [facility]. Please see a copy of my visit note below.    Oncology/Hematology Visit Note  Feb 1, 2021    Reason for Visit: Follow up of breast cancer    Oncologic history summary:  Oncologic diagnosis and prognostic factors:  Postmenopausal, left breast invasive ductal carcinoma, ER/OK+ HER2 negative, stage IA, Dx in 12/2015  - Abnormal screening mammography in 12/2015, Bx confirmed invasive ductal carcinoma, grade 2, associated w/ DCIS, ER/OK+, HER2 negative  - Lumpectomy w/ SLND on 12/24/16; invasive ductal carcinoma, grade 3, 1.7x1.7x1.6 cm, margin negative, ER+ 90%, OK+ 70%, HER2 negative by IHC, negative LN; pT1cN0  - Complete adjuvant radiation   - Oncotype Dx score 19    Treatment:  Arimidex started 5/7/16-    Interval History:  Ms. Williamson returns to clinic today for follow up of stage I left breast cancer currently on Arimidex since 5/2016.     She feels well. She tolerates the Amiridex well with no side effects. She is exercising frequently and taking her vit D/calcium for bone health. GERD well controlled with Zantac BID and PRN omeprazole. No new medical concerns and otherwise review of systems negative.     She overall is feeling well.  She has been quarantining a lot with covid.  She had her first vaccine dose.    She is feeling well otherwise with no new medical history or family history.    She had a mammogram today.  She is anxious to know the results.    She has questions about prolonged endocrine therapy.    Review of Systems:  Patient denies fevers, chills, night sweats, unexplained weight changes, headaches, dizziness, vision or hearing changes, new lumps or bumps, chest pain, shortness of breath, cough, abdominal pain, nausea, vomiting, changes to bowel or bladder, swelling of extremities, bleeding  issues, or rash.    Current Outpatient Medications   Medication Sig Dispense Refill     anastrozole (ARIMIDEX) 1 MG tablet Take 1 tablet (1 mg) by mouth daily 90 tablet 3     calcium citrate-vitamin D (CITRACAL) 315-250 MG-UNIT TABS Take 2 tablets by mouth daily 500 mg daily       Cholecalciferol (VITAMIN D3 PO) Take 2,000 Units by mouth daily        desonide (DESOWEN) 0.05 % external ointment Apply topically 2 times daily 60 g 3     famotidine (PEPCID) 20 MG tablet Take 20 mg by mouth 2 times daily       fluocinonide (LIDEX) 0.05 % external solution apply to scalp twice daily as needed       hydrocortisone 2.5 % cream Apply a thin layer to affected skin twice a day for up to  3-4 weeks face only 1 week       ketoconazole (NIZORAL) 2 % external shampoo Apply to scalp 2-3 times per week for 5 minutes, lather, and then rinse out.       melatonin 3 MG CAPS Take 3 mg by mouth as needed       Nutritional Supplements (IMMUNE ENHANCE PO) daily        omeprazole (PRILOSEC) 20 MG DR capsule Take 20 mg by mouth as needed       traZODone (DESYREL) 50 MG tablet Take 100 mg by mouth At Bedtime       Valerian 500 MG CAPS Take by mouth At Bedtime        desonide (DESOWEN) 0.05 % cream Apply to affected areas on ears and groin twice a day as needed.       multivitamin, therapeutic with minerals (THERA-VIT-M) TABS Take 1 tablet by mouth daily       triamcinolone (KENALOG) 0.1 % ointment          Physical Examination:  /78   Pulse 65   Temp 98  F (36.7  C) (Oral)   Resp 18   Wt 72.6 kg (160 lb)   SpO2 99%   BMI 24.34 kg/m    Wt Readings from Last 10 Encounters:   02/01/21 72.6 kg (160 lb)   06/19/20 71.8 kg (158 lb 3.2 oz)   01/13/20 74.4 kg (164 lb)   06/03/19 75.8 kg (167 lb 3.2 oz)   12/03/18 73.7 kg (162 lb 8 oz)   05/25/18 73.7 kg (162 lb 8 oz)   11/27/17 71.5 kg (157 lb 9.6 oz)   05/22/17 68.2 kg (150 lb 4.8 oz)   02/27/17 67.5 kg (148 lb 12.8 oz)   11/28/16 66.5 kg (146 lb 8 oz)     Constitutional: Well-appearing  female in no acute distress.  Eyes: EOMI, PERRL. No scleral icterus.  ENT: Oral mucosa is moist without lesions or thrush.   Lymphatic: Neck is supple without cervical or supraclavicular lymphadenopathy. No axillary lymphadenopathy.  Breast:   No palpable abnormalities in left or right breast. No other skin concerns on either breast.  Cardiovascular: Regular rate and rhythm. No murmurs, gallops, or rubs. No peripheral edema.  Respiratory: Clear to auscultation bilaterally. No wheezes or crackles.  Gastrointestinal: Bowel sounds present. Abdomen soft, non-tender. No palpable hepatosplenomegaly or masses.   Neurologic: Cranial nerves II through XII are grossly intact.  Skin: No rashes, petechiae, or bruising noted on exposed skin.    Laboratory Data:  No new laboratory data.  Mammogram today is pending       Assessment and Plan:  80-year-old female with history of invasive ductal carcinoma left breast T1c N0, ER positive, [place] positive, HER2 negative (equivocal by FISH, and 1+ by IHC).  Oncotype score was 19 (risk of distant recurrence assuming she takes tamoxifen for 5 years of 12% with tamoxifen alone). Underwent lumpectomy w/ negative SLND followed by XRT. Currently on Arimidex since 5/2016.      Mammogram today pending.  No concerning findings on exam.  We discussed the pros and cons of prolonged endocrine therapy, briefly discussing the MA17 results, outlining that some patients can get a benefit with 10 vs 5 years of an AI.   We also discussed the Breast Cancer Index test that can be used to stratify patients who may benefit from prolonged endocrine therapy.  Ultimately we decided to continue AI through May which will be 5 years.  Check BCI.  Obtain dexa.  Review pros and cons in May, having this information available.    No other concerns for disease recurrence on clinical exam today or by imaging.    Plan to have her back in May for routine follow-up    Bone health:   Underwent DEXA 5/2019 with no signs of  osteopenia or osteoporosis.   Will continue with DEXA every 2 years. She was counseled regarding continuation of vitamin D and calcium supplementation in the setting of Arimidex use. Continue exercising.  Dexa in May.    HTN - improved.  She is exercising regularly.          Cindy Alves MD

## 2021-02-01 NOTE — NURSING NOTE
"Oncology Rooming Note    February 1, 2021 8:25 AM   Radhika Williamson is a 80 year old female who presents for:    Chief Complaint   Patient presents with     Oncology Clinic Visit     BREAST CANCER      Initial Vitals: /78   Pulse 65   Temp 98  F (36.7  C) (Oral)   Resp 18   Wt 72.6 kg (160 lb)   SpO2 99%   BMI 24.34 kg/m   Estimated body mass index is 24.34 kg/m  as calculated from the following:    Height as of 6/19/20: 1.727 m (5' 7.99\").    Weight as of this encounter: 72.6 kg (160 lb). Body surface area is 1.87 meters squared.  No Pain (0) Comment: Data Unavailable   No LMP recorded. Patient is postmenopausal.  Allergies reviewed: Yes  Medications reviewed: Yes    Medications: Medication refills not needed today.  Pharmacy name entered into [employer]: [id number] - [place], [place] - [address] AT [facility] [address]    Clinical concerns: No new concerns today.       Belen Rodriguez MA            "

## 2021-02-01 NOTE — PROGRESS NOTES
Oncology/Hematology Visit Note  Feb 1, 2021    Reason for Visit: Follow up of breast cancer    Oncologic history summary:  Oncologic diagnosis and prognostic factors:  Postmenopausal, left breast invasive ductal carcinoma, ER/RI+ HER2 negative, stage IA, Dx in 12/2015  - Abnormal screening mammography in 12/2015, Bx confirmed invasive ductal carcinoma, grade 2, associated w/ DCIS, ER/RI+, HER2 negative  - Lumpectomy w/ SLND on 12/24/16; invasive ductal carcinoma, grade 3, 1.7x1.7x1.6 cm, margin negative, ER+ 90%, RI+ 70%, HER2 negative by IHC, negative LN; pT1cN0  - Complete adjuvant radiation   - Oncotype Dx score 19    Treatment:  Arimidex started 5/7/16-    Interval History:  Ms. Williamson returns to clinic today for follow up of stage I left breast cancer currently on Arimidex since 5/2016.     She feels well. She tolerates the Amiridex well with no side effects. She is exercising frequently and taking her vit D/calcium for bone health. GERD well controlled with Zantac BID and PRN omeprazole. No new medical concerns and otherwise review of systems negative.     She overall is feeling well.  She has been quarantining a lot with covid.  She had her first vaccine dose.    She is feeling well otherwise with no new medical history or family history.    She had a mammogram today.  She is anxious to know the results.    She has questions about prolonged endocrine therapy.    Review of Systems:  Patient denies fevers, chills, night sweats, unexplained weight changes, headaches, dizziness, vision or hearing changes, new lumps or bumps, chest pain, shortness of breath, cough, abdominal pain, nausea, vomiting, changes to bowel or bladder, swelling of extremities, bleeding issues, or rash.    Current Outpatient Medications   Medication Sig Dispense Refill     anastrozole (ARIMIDEX) 1 MG tablet Take 1 tablet (1 mg) by mouth daily 90 tablet 3     calcium citrate-vitamin D (CITRACAL) 315-250 MG-UNIT TABS Take 2 tablets by mouth  daily 500 mg daily       Cholecalciferol (VITAMIN D3 PO) Take 2,000 Units by mouth daily        desonide (DESOWEN) 0.05 % external ointment Apply topically 2 times daily 60 g 3     famotidine (PEPCID) 20 MG tablet Take 20 mg by mouth 2 times daily       fluocinonide (LIDEX) 0.05 % external solution apply to scalp twice daily as needed       hydrocortisone 2.5 % cream Apply a thin layer to affected skin twice a day for up to  3-4 weeks face only 1 week       ketoconazole (NIZORAL) 2 % external shampoo Apply to scalp 2-3 times per week for 5 minutes, lather, and then rinse out.       melatonin 3 MG CAPS Take 3 mg by mouth as needed       Nutritional Supplements (IMMUNE ENHANCE PO) daily        omeprazole (PRILOSEC) 20 MG DR capsule Take 20 mg by mouth as needed       traZODone (DESYREL) 50 MG tablet Take 100 mg by mouth At Bedtime       Valerian 500 MG CAPS Take by mouth At Bedtime        desonide (DESOWEN) 0.05 % cream Apply to affected areas on ears and groin twice a day as needed.       multivitamin, therapeutic with minerals (THERA-VIT-M) TABS Take 1 tablet by mouth daily       triamcinolone (KENALOG) 0.1 % ointment          Physical Examination:  /78   Pulse 65   Temp 98  F (36.7  C) (Oral)   Resp 18   Wt 72.6 kg (160 lb)   SpO2 99%   BMI 24.34 kg/m    Wt Readings from Last 10 Encounters:   02/01/21 72.6 kg (160 lb)   06/19/20 71.8 kg (158 lb 3.2 oz)   01/13/20 74.4 kg (164 lb)   06/03/19 75.8 kg (167 lb 3.2 oz)   12/03/18 73.7 kg (162 lb 8 oz)   05/25/18 73.7 kg (162 lb 8 oz)   11/27/17 71.5 kg (157 lb 9.6 oz)   05/22/17 68.2 kg (150 lb 4.8 oz)   02/27/17 67.5 kg (148 lb 12.8 oz)   11/28/16 66.5 kg (146 lb 8 oz)     Constitutional: Well-appearing female in no acute distress.  Eyes: EOMI, PERRL. No scleral icterus.  ENT: Oral mucosa is moist without lesions or thrush.   Lymphatic: Neck is supple without cervical or supraclavicular lymphadenopathy. No axillary lymphadenopathy.  Breast:   No palpable  abnormalities in left or right breast. No other skin concerns on either breast.  Cardiovascular: Regular rate and rhythm. No murmurs, gallops, or rubs. No peripheral edema.  Respiratory: Clear to auscultation bilaterally. No wheezes or crackles.  Gastrointestinal: Bowel sounds present. Abdomen soft, non-tender. No palpable hepatosplenomegaly or masses.   Neurologic: Cranial nerves II through XII are grossly intact.  Skin: No rashes, petechiae, or bruising noted on exposed skin.    Laboratory Data:  No new laboratory data.  Mammogram today is pending       Assessment and Plan:  80-year-old female with history of invasive ductal carcinoma left breast T1c N0, ER positive, RI positive, HER2 negative (equivocal by FISH, and 1+ by IHC).  Oncotype score was 19 (risk of distant recurrence assuming she takes tamoxifen for 5 years of 12% with tamoxifen alone). Underwent lumpectomy w/ negative SLND followed by XRT. Currently on Arimidex since 5/2016.      Mammogram today pending.  No concerning findings on exam.  We discussed the pros and cons of prolonged endocrine therapy, briefly discussing the MA17 results, outlining that some patients can get a benefit with 10 vs 5 years of an AI.   We also discussed the Breast Cancer Index test that can be used to stratify patients who may benefit from prolonged endocrine therapy.  Ultimately we decided to continue AI through May which will be 5 years.  Check BCI.  Obtain dexa.  Review pros and cons in May, having this information available.    No other concerns for disease recurrence on clinical exam today or by imaging.    Plan to have her back in May for routine follow-up    Bone health:   Underwent DEXA 5/2019 with no signs of osteopenia or osteoporosis.   Will continue with DEXA every 2 years. She was counseled regarding continuation of vitamin D and calcium supplementation in the setting of Arimidex use. Continue exercising.  Dexa in May.    HTN - improved.  She is exercising  regularly.          Cindy Alves MD

## 2021-02-03 DIAGNOSIS — Z17.0 MALIGNANT NEOPLASM OF LOWER-OUTER QUADRANT OF LEFT BREAST OF FEMALE, ESTROGEN RECEPTOR POSITIVE (H): Primary | ICD-10-CM

## 2021-02-03 DIAGNOSIS — C50.512 MALIGNANT NEOPLASM OF LOWER-OUTER QUADRANT OF LEFT BREAST OF FEMALE, ESTROGEN RECEPTOR POSITIVE (H): Primary | ICD-10-CM

## 2021-02-06 ENCOUNTER — PATIENT OUTREACH (OUTPATIENT)
Dept: ONCOLOGY | Facility: CLINIC | Age: 81
End: 2021-02-06

## 2021-02-06 NOTE — PROGRESS NOTES
Breast Cancer Index ordered and faxed to [phone] on specimen [id number]. Jessi Alvarez RN, BSN Breast Center Nurse Coordinator

## 2021-02-17 ENCOUNTER — VIRTUAL VISIT (OUTPATIENT)
Dept: ONCOLOGY | Facility: CLINIC | Age: 81
End: 2021-02-17
Attending: INTERNAL MEDICINE
Payer: COMMERCIAL

## 2021-02-17 DIAGNOSIS — Z80.3 FAMILY HISTORY OF MALIGNANT NEOPLASM OF BREAST: ICD-10-CM

## 2021-02-17 DIAGNOSIS — C50.912 MALIGNANT NEOPLASM OF LEFT BREAST IN FEMALE, ESTROGEN RECEPTOR POSITIVE, UNSPECIFIED SITE OF BREAST (H): Primary | ICD-10-CM

## 2021-02-17 DIAGNOSIS — Z80.0 FAMILY HISTORY OF PANCREATIC CANCER: ICD-10-CM

## 2021-02-17 DIAGNOSIS — Z17.0 MALIGNANT NEOPLASM OF LEFT BREAST IN FEMALE, ESTROGEN RECEPTOR POSITIVE, UNSPECIFIED SITE OF BREAST (H): Primary | ICD-10-CM

## 2021-02-17 DIAGNOSIS — Z80.42 FAMILY HISTORY OF PROSTATE CANCER: ICD-10-CM

## 2021-02-17 PROCEDURE — 96040 HC GENETIC COUNSELING, EACH 30 MINUTES: CPT | Mod: GT | Performed by: GENETIC COUNSELOR, MS

## 2021-02-17 NOTE — LETTER
2/17/2021         RE: Radhika Williamson  [address] [facility] [address]        Dear Colleague,    Thank you for referring your patient, Radhika Williamson, to the [facility]. Please see a copy of my visit note below.    2/17/2021    Referring Provider: Cindy Alves MD    Presenting Information:   I met with Radhika Williamson today for genetic counseling at the Cancer Risk Management Program (virtual visit) to discuss her personal and family history of cancer.  She is here today to review this history, cancer screening recommendations, and available genetic testing options.    Personal History:  Radhika is a 80 year old female. She was diagnosed with invasive ductal carcinoma in 2015 (age 76) treated with lumpectomy and radiation.  Her most recent mammogram from 2021 was benign, and she follows up annually.  Her most recent colonoscopy from 2020 identified 2 small polyps, and she reports no prior polyp history     Family History: (Please see scanned pedigree for detailed family history information)    One maternal aunt was diagnosed with breast cancer at age 48    One maternal uncle was diagnosed with breast cancer at age 85    One paternal aunt was diagnosed with pancreatic cancer at age 70    Her maternal ethnicity is [place]. Her paternal ethnicity is [place].  There is no known Ashkenazi Buddhist ancestry on either side of her family.     Discussion:    Radhika's personal and family history of cancer may be suggestive of a hereditary cancer syndrome.    We reviewed the features of sporadic, familial, and hereditary cancers. Based on her personal and family history, Radhika meets current National Comprehensive Cancer Network (NCCN) criteria for genetic testing of high penetrance breast cancer susceptibility genes.      We discussed the natural history and genetics of Hereditary Breast and Ovarian Cancer syndrome caused by mutations in the BRCA1/2 genes.     A detailed handout regarding  hereditary breast cancer and the information we discussed was provided to Radhika at the end of our appointment today and can be found in the after visit summary. Topics included: inheritance pattern, cancer risks, cancer screening recommendations, and also risks, benefits and limitations of testing.    We discussed that there are additional genes that could cause increased risk for breast, prostate and pancreatic cancer. As many of these genes present with overlapping features in a family and accurate cancer risk cannot always be established based upon the pedigree analysis alone, it would be reasonable for Radhika to consider panel genetic testing to analyze multiple genes at once.    Radhika expressed interest in expanded genetic testing today.  She elected to first proceed with an insurance prior authorization for the 36 gene hereditary cancer panel through [employer] ([employer]): APC, MUNIR, AXIN2, BARD1, BMPR1A, BRCA1, BRCA2, BRIP1, CDH1, CDK4, CDKN2A, CHEK2, DICER1, EPCAM, GREM1, HOXB13, MLH1, MSH2, MSH3, MSH6, MUTYH, NBN, NF1, NTHL1, PALB2, PMS2, POLD1, POLE, PTEN, RAD51C, RAD51D, RECQL, SMAD4, SMARCA4, STK11, and TP53.  This panel includes genes related to hereditary breast, gynecologic, gastrointestinal, and related cancers.     Medical Management: For Radhika, we reviewed that the information from genetic testing may determine additional cancer screening for which Radhika/her close relatives may qualify, options for risk reducing surgeries and possible  targeted chemotherapies for certain cancers (i.e. immunotherapy for individuals with Parisi syndrome, PARP inhibitors, etc.). These recommendations will be discussed in detail once genetic testing is completed.     Plan:  1) Today Radhika elected to proceed with a prior authorization for the 36 gene hereditary cancer panel through [employer]  2) This information should be available in several weeks.  3) Radhika will return to clinic to discuss the  results.    Time spent over phone: 25 minutes    Karina Salmon MS, [facility]  Licensed, Certified Genetic Counselor  [facility]  Phone: [phone]

## 2021-02-17 NOTE — PROGRESS NOTES
2/17/2021    Referring Provider: Cindy Alves MD    Presenting Information:   I met with Radhika Williamson today for genetic counseling at the Cancer Risk Management Program (virtual visit) to discuss her personal and family history of cancer.  She is here today to review this history, cancer screening recommendations, and available genetic testing options.    Personal History:  Radhika is a 80 year old female. She was diagnosed with invasive ductal carcinoma in 2015 (age 76) treated with lumpectomy and radiation.  Her most recent mammogram from 2021 was benign, and she follows up annually.  Her most recent colonoscopy from 2020 identified 2 small polyps, and she reports no prior polyp history     Family History: (Please see scanned pedigree for detailed family history information)    One maternal aunt was diagnosed with breast cancer at age 48    One maternal uncle was diagnosed with breast cancer at age 85    One paternal aunt was diagnosed with pancreatic cancer at age 70    Her maternal ethnicity is Burkin[place]. Her paternal ethnicity is Burkin[place].  There is no known Ashkenazi Spiritism ancestry on either side of her family.     Discussion:    Radhika's personal and family history of cancer may be suggestive of a hereditary cancer syndrome.    We reviewed the features of sporadic, familial, and hereditary cancers. Based on her personal and family history, Radhika meets current National Comprehensive Cancer Network (NCCN) criteria for genetic testing of high penetrance breast cancer susceptibility genes.      We discussed the natural history and genetics of Hereditary Breast and Ovarian Cancer syndrome caused by mutations in the BRCA1/2 genes.     A detailed handout regarding hereditary breast cancer and the information we discussed was provided to Radhika at the end of our appointment today and can be found in the after visit summary. Topics included: inheritance pattern, cancer risks, cancer screening recommendations, and also  risks, benefits and limitations of testing.    We discussed that there are additional genes that could cause increased risk for breast, prostate and pancreatic cancer. As many of these genes present with overlapping features in a family and accurate cancer risk cannot always be established based upon the pedigree analysis alone, it would be reasonable for Radhika to consider panel genetic testing to analyze multiple genes at once.    Radhika expressed interest in expanded genetic testing today.  She elected to first proceed with an insurance prior authorization for the 36 gene hereditary cancer panel through [employer] ([employer]): APC, MUNIR, AXIN2, BARD1, BMPR1A, BRCA1, BRCA2, BRIP1, CDH1, CDK4, CDKN2A, CHEK2, DICER1, EPCAM, GREM1, HOXB13, MLH1, MSH2, MSH3, MSH6, MUTYH, NBN, NF1, NTHL1, PALB2, PMS2, POLD1, POLE, PTEN, RAD51C, RAD51D, RECQL, SMAD4, SMARCA4, STK11, and TP53.  This panel includes genes related to hereditary breast, gynecologic, gastrointestinal, and related cancers.     Medical Management: For Radhika, we reviewed that the information from genetic testing may determine additional cancer screening for which Radhika/her close relatives may qualify, options for risk reducing surgeries and possible  targeted chemotherapies for certain cancers (i.e. immunotherapy for individuals with Parisi syndrome, PARP inhibitors, etc.). These recommendations will be discussed in detail once genetic testing is completed.     Plan:  1) Today Radhika elected to proceed with a prior authorization for the 36 gene hereditary cancer panel through [employer]  2) This information should be available in several weeks.  3) Radhika will return to clinic to discuss the results.    Time spent over phone: 25 minutes    Karina Salmon MS, [facility]  Licensed, Certified Genetic Counselor  [facility]  Phone: [phone]

## 2021-02-22 LAB — LAB SCANNED RESULT: NORMAL

## 2021-02-24 NOTE — PATIENT INSTRUCTIONS
Assessing Cancer Risk  Only about 5-10% of cancers are thought to be due to an inherited cancer susceptibility gene.    These families often have:    Several people with the same or related types of cancer    Cancers diagnosed at a young age (before age 50)    Individuals with more than one primary cancer    Multiple generations of the family affected with cancer    Some people may be candidates for genetic testing of more than one gene.  For these families, genetic testing using a cancer panel may be offered.  These panels will test different genes known to increase the risk for breast, ovarian, uterine, and/or other cancers. All of the genes discussed below have published clinical management guidelines for individuals who are found to carry a mutation. The purpose of this handout is to serve as a brief summary of the genes analyzed by the panels used to inquire about hereditary breast and gynecologic cancer:  MUNIR, BRCA1, BRCA2, BRIP1, CDH1, CHEK2, MLH1, MSH2, MSH6, PMS2, EPCAM, PTEN, PALB2, RAD51C, RAD51D, and TP53.  ______________________________________________________________________________  Hereditary Breast and Ovarian Cancer Syndrome   (BRCA1 and BRCA2)  A single mutation in one of the copies of BRCA1 or BRCA2 increases the risk for breast and ovarian cancer, among others.  The risk for pancreatic cancer and melanoma may also be slightly increased in some families.  The chart below shows the chance that someone with a BRCA mutation would develop cancer in his or her lifetime1,2,3,4.        A person s ethnic background is also important to consider, as individuals of Ashkenazi Orthodox ancestry have a higher chance of having a BRCA gene mutation.  There are three BRCA mutations that occur more frequently in this population.    Parisi Syndrome   (MLH1, MSH2, MSH6, PMS2, and EPCAM)  Currently five genes are known to cause Parisi Syndrome: MLH1, MSH2, MSH6, PMS2, and EPCAM.  A single mutation in one of the  Parisi Syndrome genes increases the risk for colon, endometrial, ovarian, and stomach cancers.  Other cancers that occur less commonly in Parisi Syndrome include urinary tract, skin, and brain cancers.  The chart below shows the chance that a person with Parisi syndrome would develop cancer in his or her lifetime5.      *Cancer risk varies depending on Parisi syndrome gene found    Cowden Syndrome   (PTEN)  Cowden syndrome is a hereditary condition that increases the risk for breast, thyroid, endometrial, colon, and kidney cancer.  Cowden syndrome is caused by a mutation in the PTEN gene.  A single mutation in one of the copies of PTEN causes Cowden syndrome and increases cancer risk.  The chart below shows the chance that someone with a PTEN mutation would develop cancer in their lifetime6,7.  Other benign features seen in some individuals with Cowden syndrome include benign skin lesions (facial papules, keratoses, lipomas), learning disability, autism, thyroid nodules, colon polyps, and larger head size.      *One recent study found breast cancer risk to be increased to 85%    Li-Fraumeni Syndrome   (TP53)  Li-Fraumeni Syndrome (LFS) is a cancer predisposition syndrome caused by a mutation in the TP53 gene. A single mutation in one of the copies of TP53 increases the risk for multiple cancers. Individuals with LFS are at an increased risk for developing cancer at a young age. The lifetime risk for development of a LFS-associated cancer is 50% by age 30 and 90% by age 60.   Core Cancers: Sarcomas, Breast, Brain, Lung, Leukemias/Lymphomas, Adrenocortical carcinomas  Other Cancers: Gastrointestinal, Thyroid, Skin, Genitourinary    Hereditary Diffuse Gastric Cancer   (CDH1)  Currently, one gene is known to cause hereditary diffuse gastric cancer (HDGC): CDH1.  Individuals with HDGC are at increased risk for diffuse gastric cancer and lobular breast cancer. Of people diagnosed with HDGC, 30-50% have a mutation in the CDH1  gene.  This suggests there are likely other genes that may cause HDGC that have not been identified yet.      Lifetime Cancer Risks    General Population HDGC    Diffuse Gastric  <1% ~80%   Breast 12% 39-52%         Additional Genes  MUNIR  MUNIR is a moderate-risk breast cancer gene. Women who have a mutation in MUNIR can have between a 2-4 fold increased risk for breast cancer compared to the general population8. MUNIR mutations have also been associated with increased risk for pancreatic cancer, however an estimate of this cancer risk is not well understood9. Individuals who inherit two MUNIR mutations have a condition called ataxia-telangiectasia (AT).  This rare autosomal recessive condition affects the nervous system and immune system, and is associated with progressive cerebellar ataxia beginning in childhood.  Individuals with ataxia-telangiectasia often have a weakened immune system and have an increased risk for childhood cancers.    PALB2  Mutations in PALB2 have been shown to increase the risk of breast cancer up to 33-58% in some families; where individuals fall within this risk range is dependent upon family [id number]. PALB2 mutations have also been associated with increased risk for pancreatic cancer, although this risk has not been quantified yet.  Individuals who inherit two PALB2 mutations--one from their mother and one from their father--have a condition called Fanconi Anemia.  This rare autosomal recessive condition is associated with short stature, developmental delay, bone marrow failure, and increased risk for childhood cancers.    CHEK2   CHEK2 is a moderate-risk breast cancer gene.  Women who have a mutation in CHEK2 have around a 2-fold increased risk for breast cancer compared to the general population, and this risk may be higher depending upon family history.11,12,13 Mutations in CHEK2 have also been shown to increase the risk of a number of other cancers, including colon and prostate, however  these cancer risks are currently not well understood.    BRIP1, RAD51C and RAD51D  Mutations in BRIP1, RAD51C, and RAD51D have been shown to increase the risk of ovarian cancer and possibly female breast cancer as well14,15 .       Lifetime Cancer Risk    General Population BRIP1 RAD51C RAD51D   Ovarian 1-2% ~5-8% ~5-9% ~7-15%           Inheritance  All of the cancer syndromes reviewed above are inherited in an autosomal dominant pattern.  This means that if a parent has a mutation, each of his or her children will have a 50% chance of inheriting that same mutation.  Therefore, each child--male or female--would have a 50% chance of being at increased risk for developing cancer.      Image obtained from Genetics Home Reference, 2013     Mutations in some genes can occur de ankit, which means that a person s mutation occurred for the first time in them and was not inherited from a parent.  Now that they have the mutation, however, it can be passed on to future generations.    Genetic Testing  Genetic testing involves a blood test and will look at the genetic information in the MUNIR, BRCA1, BRCA2, BRIP1, CDH1, CHEK2, MLH1, MSH2, MSH6, PMS2, EPCAM, PTEN, PALB2, RAD51C, RAD51D, and TP53 genes for any harmful mutations that are associated with increased cancer risk.  If possible, it is recommended that the person(s) who has had cancer be tested before other family members.  That person will give us the most useful information about whether or not a specific gene is associated with the cancer in the family.    Results  There are three possible results of genetic testing:    Positive--a harmful mutation was identified in one or more of the genes    Negative--no mutation was identified in any of the genes on this panel    Variant of unknown significance--a variation in one of the genes was identified, but it is unclear how this impacts cancer risk in the family    Advantages and Disadvantages   There are advantages and  disadvantages to genetic testing.    Advantages    May clarify your cancer risk    Can help you make medical decisions    May explain the cancers in your family    May give useful information to your family members (if you share your results)    Disadvantages    Possible negative emotional impact of learning about inherited cancer risk    Uncertainty in interpreting a negative test result in some situations    Possible genetic discrimination concerns (see below)    Genetic Information Nondiscrimination Act (CHERYL)  CHERYL is a federal law that protects individuals from health insurance or employment discrimination based on a genetic test result alone.  Although rare, there are currently no legal discrimination protections in terms of life insurance, long term care, or disability insurances.  Visit the [employer] website to learn more.    Reducing Cancer Risk  All of the genes described above have nationally recognized cancer screening guidelines that would be recommended for individuals who test positive.  In addition to increased cancer screening, surgeries may be offered or recommended to reduce cancer risk.  Recommendations are based upon an individual s genetic test result as well as their personal and family history of cancer.    Questions to Think About Regarding Genetic Testing:    What effect will the test result have on me and my relationship with my family members if I have an inherited gene mutation?  If I don t have a gene mutation?    Should I share my test results, and how will my family react to this news, which may also affect them?    Are my children ready to learn new information that may one day affect their own health?    Hereditary Cancer Resources    FORCE: Facing Our Risk of Cancer Empowered facingourrisk.org   Bright Pink bebrightpink.org   Li-Fraumeni Syndrome Association lfsassociation.org   PTEN World PTENworld.com   No stomach for cancer, Inc.  nostomachforcancer.org   Stomach cancer relief network Scrnet.org   Collaborative Group of the Americas on Inherited Colorectal Cancer (CGA) cgaicc.com    Cancer Care cancercare.org   American Cancer Society (ACS) cancer.org   National Cancer [place] (NCI) cancer.gov     Please call us if you have any questions or concerns.   Cancer Risk Management Program [phone] ([phone])  ? Rambo Patel, MS, [facility] [phone]  ? Jenny Richmond, MS, [facility]  [phone]  ? Karina Salmon, MS, [facility]  [phone]  ? Jil Kidd, MS, [facility] [phone]  ? Mavis Priti, MS, [facility] [phone]  ? Evelin Marie, MS, [facility]  [phone]    References  1. Farzad A, Karine PDP, Nancy S, Hector BRANHAM, Flory JE, Moi JL, Sanaz N, Maxine H, Ish O, Martin A, Benita B, Jorge P, Manlinda S, Noah DM, Ashton N, Marlena E, Therese H, Harjinder E, Jayne J, Gronarnoldo J, Audrey B, Steve H, Thorlacius S, Eerola H, Miguelito H, She K, Anuja OP. Average risks of breast and ovarian cancer associated with BRCA1 or BRCA2 mutations detected in case series unselected for family history: a combined analysis of 222 studies. Am J Hum Alee. 2003;72:1117-30.  2. Jhony IRIZARRY, Macie M, Anita G.  BRCA1 and BRCA2 Hereditary Breast and Ovarian Cancer. Gene Reviews online. 2013.  3. Malcolm YC, Radha S, Art G, Camejo S. Breast cancer risk among male BRCA1 and BRCA2 mutation carriers. J Natl Cancer Inst. 2007;99:1811-4.  4. Turner ROSE, Kaz I, Pankaj J, Terrence E, Gabriela ER, Fabiola F. Risk of breast cancer in male BRCA2 carriers. J Med Alee. 2010;47:710-1.  5. National Comprehensive Cancer Network. Clinical practice guidelines in oncology, colorectal cancer screening. Available online (registration required). 2015.  6. Merlin STRICKLAND, Jett J, Srinivas J, Nohelia LA, Vasquez SOUZA, Suzanne C. Lifetime cancer risks in individuals with germline PTEN mutations. Clin Cancer Res. 2012;18:400-7.  7. Clint NOEL. Cowden Syndrome: A Critical Review of the  Clinical Literature. J Alee . 2009:18:13-27.  8. Yumiko A, Aniceto D, Kitty S, Yessy P, Deepa T, Juno M, Jun B, Esme H, Genie R, Bill K, Brittney L, Turner DG, Noah D, Best DF, Maria E MR, The Breast Cancer Susceptibility Collaboration () & Vanesa IRIZARRY. MUNIR mutations that cause ataxia-telangiectasia are breast cancer susceptibility alleles. Nature Genetics. 2006;38:873-875  9. Mohan N , Aubree Y, Kamilah J, Jocelyn L, Omar GM , Boris ML, Gallinger S, Valles AG, Syngal S, Angelia ML, Renny J , Alina R, Joselito SZ, Ollie JR, Anne VE, María Elena M, Vojean B, Lui N, Osvaldo RH, Vicki KW, and Antony AP. MUNIR mutations in patients with hereditary pancreatic cancer. Cancer Discover. 2012;2:41-46  10. Farzad HIGH, et al. Breast-Cancer Risk in Families with Mutations in PALB2. NEJM. 2014; 371(6):497-506.  11. CHEK2 Breast Cancer Case-Control Consortium. CHEK2*1100delC and susceptibility to breast cancer: A collaborative analysis involving 10,860 breast cancer cases and 9,065 controls from 10 studies. Am J Hum Alee, 74 (2004), pp. 7872-0678  12. Dallin T, Ray S, Ryan K, et al. Spectrum of Mutations in BRCA1, BRCA2, CHEK2, and TP53 in Families at High Risk of Breast Cancer. TELMA. 2006;295(12):0370-4476.   13. Valorie C, Millie D, Marcio A, et al. Risk of breast cancer in women with a CHEK2 mutation with and without a family history of breast cancer. J Clin Oncol. 2011;29:0811-5618.  14. Joaquim H, Rudy E, Jaun SJ, et al. Contribution of germline mutations in the RAD51B, RAD51C, and RAD51D genes to ovarian cancer in the population. J Clin Oncol. 2015;33(26):8299-1549. Doi:10.1200/JCO.2015.61.2408.  15. Olamide T, Soraya ROSENBERG, Jayson P, et al. Mutations in BRIP1 confer high risk of ovarian cancer. Samantha Alee. 2011;43(11):7583-6257. doi:10.1038/ng.955.

## 2021-03-16 ENCOUNTER — VIRTUAL VISIT (OUTPATIENT)
Dept: ONCOLOGY | Facility: CLINIC | Age: 81
End: 2021-03-16
Attending: GENETIC COUNSELOR, MS
Payer: COMMERCIAL

## 2021-03-16 DIAGNOSIS — Z17.0 MALIGNANT NEOPLASM OF LEFT BREAST IN FEMALE, ESTROGEN RECEPTOR POSITIVE, UNSPECIFIED SITE OF BREAST (H): Primary | ICD-10-CM

## 2021-03-16 DIAGNOSIS — Z80.3 FAMILY HISTORY OF MALIGNANT NEOPLASM OF BREAST: ICD-10-CM

## 2021-03-16 DIAGNOSIS — C50.912 MALIGNANT NEOPLASM OF LEFT BREAST IN FEMALE, ESTROGEN RECEPTOR POSITIVE, UNSPECIFIED SITE OF BREAST (H): Primary | ICD-10-CM

## 2021-03-16 DIAGNOSIS — Z80.42 FAMILY HISTORY OF PROSTATE CANCER: ICD-10-CM

## 2021-03-16 DIAGNOSIS — Z80.0 FAMILY HISTORY OF PANCREATIC CANCER: ICD-10-CM

## 2021-03-16 PROCEDURE — 999N000069 HC STATISTIC GENETIC COUNSELING, < 16 MIN: Mod: GT | Performed by: GENETIC COUNSELOR, MS

## 2021-03-16 NOTE — PROGRESS NOTES
Radhika followed up today as part of the Cancer Risk Management Program to proceed with genetic testing for the 36 gene hereditary cancer panel.  A prior authorization is still pending through [employer] (expected out of pocket cost through insurance is $0, or $250 if not covered by insurance).  Verbal consent was obtained today (COVID-19 precaution) and a saliva kit will be sent to Radhika from [employer] for sample collection.  Test turn around time: 3-4 weeks.     Karina Salmon MS, [place]  Licensed, Certified Genetic Counselor  [facility]  Phone: [phone]    Time spent: less than 15 minutes

## 2021-03-16 NOTE — LETTER
3/16/2021         RE: Radhika Williamson  [address] [facility] [address]        Dear Colleague,    Thank you for referring your patient, Radhika Williamson, to the [facility]. Please see a copy of my visit note below.    Radhika followed up today as part of the Cancer Risk Management Program to proceed with genetic testing for the 36 gene hereditary cancer panel.  A prior authorization is still pending through [employer] (expected out of pocket cost through insurance is $0, or $250 if not covered by insurance).  Verbal consent was obtained today (COVID-19 precaution) and a saliva kit will be sent to Radhika from [employer] for sample collection.  Test turn around time: 3-4 weeks.     Karina Salmon MS, [facility]  Licensed, Certified Genetic Counselor  [facility]  Phone: [phone]    Time spent: less than 15 minutes

## 2021-03-18 ENCOUNTER — APPOINTMENT (OUTPATIENT)
Dept: LAB | Facility: CLINIC | Age: 81
End: 2021-03-18
Attending: COLON & RECTAL SURGERY
Payer: COMMERCIAL

## 2021-03-29 DIAGNOSIS — C50.912 MALIGNANT NEOPLASM OF LEFT BREAST IN FEMALE, ESTROGEN RECEPTOR POSITIVE, UNSPECIFIED SITE OF BREAST (H): ICD-10-CM

## 2021-03-29 DIAGNOSIS — Z80.0 FAMILY HISTORY OF PANCREATIC CANCER: ICD-10-CM

## 2021-03-29 DIAGNOSIS — Z17.0 MALIGNANT NEOPLASM OF LEFT BREAST IN FEMALE, ESTROGEN RECEPTOR POSITIVE, UNSPECIFIED SITE OF BREAST (H): ICD-10-CM

## 2021-03-29 DIAGNOSIS — Z80.3 FAMILY HISTORY OF MALIGNANT NEOPLASM OF BREAST: ICD-10-CM

## 2021-03-29 DIAGNOSIS — Z80.42 FAMILY HISTORY OF PROSTATE CANCER: ICD-10-CM

## 2021-03-29 LAB
LAB SCANNED RESULT: NORMAL
MISCELLANEOUS TEST: NORMAL

## 2021-04-14 ENCOUNTER — VIRTUAL VISIT (OUTPATIENT)
Dept: ONCOLOGY | Facility: CLINIC | Age: 81
End: 2021-04-14
Attending: GENETIC COUNSELOR, MS
Payer: COMMERCIAL

## 2021-04-14 DIAGNOSIS — Z17.0 MALIGNANT NEOPLASM OF LEFT BREAST IN FEMALE, ESTROGEN RECEPTOR POSITIVE, UNSPECIFIED SITE OF BREAST (H): Primary | ICD-10-CM

## 2021-04-14 DIAGNOSIS — C50.912 MALIGNANT NEOPLASM OF LEFT BREAST IN FEMALE, ESTROGEN RECEPTOR POSITIVE, UNSPECIFIED SITE OF BREAST (H): Primary | ICD-10-CM

## 2021-04-14 DIAGNOSIS — Z80.3 FAMILY HISTORY OF MALIGNANT NEOPLASM OF BREAST: ICD-10-CM

## 2021-04-14 DIAGNOSIS — Z80.42 FAMILY HISTORY OF PROSTATE CANCER: ICD-10-CM

## 2021-04-14 DIAGNOSIS — Z80.0 FAMILY HISTORY OF PANCREATIC CANCER: ICD-10-CM

## 2021-04-14 PROCEDURE — 999N000069 HC STATISTIC GENETIC COUNSELING, < 16 MIN: Mod: GT | Performed by: GENETIC COUNSELOR, MS

## 2021-04-14 NOTE — LETTER
"    4/14/2021         RE: Radhika Williamson  [address] [address]        Dear Colleague,    Thank you for referring your patient, Radhika Williamson, to the [facility]. Please see a copy of my visit note below.    4/14/2021    Referring Provider: Cindy Alves MD    Presenting Information:  I spoke to Radhika by phone today to discuss her genetic testing results as part of the Cancer Risk Management Program.    Genetic Testing Result: NEGATIVE  No pathogenic mutations, variants of unknown significance, or gross deletions or duplications were detected.     Genes Analyzed (36 total): APC, MUNIR, AXIN2, BARD1, BMPR1A, BRCA1, BRCA2, BRIP1, CDH1, CDK4, CDKN2A, CHEK2, DICER1, MLH1, MSH2, MSH3, MSH6, MUTYH, NBN, NF1, NTHL1, PALB2, PMS2, PTEN, RAD51C, RAD51D, RECQL, SMAD4, SMARCA4, STK11 and TP53 (sequencing and deletion/duplication); HOXB13, POLD1 and POLE (sequencing only); EPCAM and GREM1 (deletion/duplication only).    A copy of the test report can be found in the Laboratory tab, dated 3/18/2021, and named \"SEND OUTS MISC TEST\". The report is scanned in as a linked document.    Interpretation:  We discussed several different interpretations of this negative test result.    1. One explanation may be that there is a different gene or combination of genes and environment that are associated with the cancers in this family.  2. It is possible that a cancer susceptibility gene may be present in Radhika's family which she did not inherit.    3. There is also a small possibility that there is a mutation in one of these genes, and the testing laboratory could not find it with their current testing methods.       Screening:  Based on this negative test result, it is important for Radhika and her relatives to refer back to the family history for appropriate cancer screening.      Radihka s close female relatives remain at increased risk for breast cancer given their family history. Breast cancer " screening is generally recommended to begin approximately 10 years younger than the earliest age of breast cancer diagnosis in the family, or at age 40, whichever comes first. Breast screening options should be discussed with an individual's primary care provider and a genetic counselor, to determine at what age to begin screening, what screening is appropriate, and if additional screening (such as breast MRI) is necessary based on personal/family history factors.        We discussed that Radhika and her close relatives (through her paternal family) may have some increased risk for pancreatic cancer given her family history, but routine screening is typically not recommended.  She is encouraged to discuss this history with her care providers.      Other population cancer screening options, such as those recommended by the American Cancer Society and the National Comprehensive Cancer Network (NCCN), are also appropriate for Radhika and her family. These screening recommendations may change if there are changes to Radhika's personal and/or family history of cancer. Final screening recommendations should be made by each individual's managing physician.    Inheritance:  We reviewed the autosomal dominant inheritance of these 36 genes. We discussed that Radhika cannot/did not pass on an identifiable mutation in these genes to her children based on this test result.  Mutations in these genes do not skip generations.      Summary:  We do not have an explanation for Radhika's personal or family history of cancer. While no genetic changes were identified, Radhika may still be at risk for certain cancers due to family history, environmental factors, or other genetic causes not identified by this test.  Because of that, it is important that she continue with cancer screening based on her personal and family history as discussed above.    Genetic testing is rapidly advancing, and new cancer susceptibility genes will most likely be  identified in the future.  Therefore, I encouraged Radhika to contact me annually or if there are changes in her personal or family history.  This may change how we assess her cancer risk, screening, and the testing we would offer.    Plan:  1. Genetic test results and screening recommendations were discussed today.  2. She plans to follow-up with her managing physicians.  3. She should contact me annually, or sooner if her family history changes.    If Radhika has any further questions, I encouraged her to contact me at [phone].    Time spent on the phone: 5 minutes.    Karina Salmon MS, [facility]  Licensed, Certified Genetic Counselor  [facility]                    Again, thank you for allowing me to participate in the care of your patient.        Sincerely,        Karina Salmon GC

## 2021-04-14 NOTE — PROGRESS NOTES
"4/14/2021    Referring Provider: Cindy Alves MD    Presenting Information:  I spoke to Radhika by phone today to discuss her genetic testing results as part of the Cancer Risk Management Program.    Genetic Testing Result: NEGATIVE  No pathogenic mutations, variants of unknown significance, or gross deletions or duplications were detected.     Genes Analyzed (36 total): APC, MUNIR, AXIN2, BARD1, BMPR1A, BRCA1, BRCA2, BRIP1, CDH1, CDK4, CDKN2A, CHEK2, DICER1, MLH1, MSH2, MSH3, MSH6, MUTYH, NBN, NF1, NTHL1, PALB2, PMS2, PTEN, RAD51C, RAD51D, RECQL, SMAD4, SMARCA4, STK11 and TP53 (sequencing and deletion/duplication); HOXB13, POLD1 and POLE (sequencing only); EPCAM and GREM1 (deletion/duplication only).    A copy of the test report can be found in the Laboratory tab, dated 3/18/2021, and named \"SEND OUTS MISC TEST\". The report is scanned in as a linked document.    Interpretation:  We discussed several different interpretations of this negative test result.    1. One explanation may be that there is a different gene or combination of genes and environment that are associated with the cancers in this family.  2. It is possible that a cancer susceptibility gene may be present in Radhika's family which she did not inherit.    3. There is also a small possibility that there is a mutation in one of these genes, and the testing laboratory could not find it with their current testing methods.       Screening:  Based on this negative test result, it is important for Radhika and her relatives to refer back to the family history for appropriate cancer screening.      Radhika s close female relatives remain at increased risk for breast cancer given their family history. Breast cancer screening is generally recommended to begin approximately 10 years younger than the earliest age of breast cancer diagnosis in the family, or at age 40, whichever comes first. Breast screening options should be discussed with an individual's primary care " provider and a genetic counselor, to determine at what age to begin screening, what screening is appropriate, and if additional screening (such as breast MRI) is necessary based on personal/family history factors.        We discussed that Radhika and her close relatives (through her paternal family) may have some increased risk for pancreatic cancer given her family history, but routine screening is typically not recommended.  She is encouraged to discuss this history with her care providers.      Other population cancer screening options, such as those recommended by the American Cancer Society and the National Comprehensive Cancer Network (NCCN), are also appropriate for Radhika and her family. These screening recommendations may change if there are changes to Radhika's personal and/or family history of cancer. Final screening recommendations should be made by each individual's managing physician.    Inheritance:  We reviewed the autosomal dominant inheritance of these 36 genes. We discussed that Radhika cannot/did not pass on an identifiable mutation in these genes to her children based on this test result.  Mutations in these genes do not skip generations.      Summary:  We do not have an explanation for Radhika's personal or family history of cancer. While no genetic changes were identified, Radhika may still be at risk for certain cancers due to family history, environmental factors, or other genetic causes not identified by this test.  Because of that, it is important that she continue with cancer screening based on her personal and family history as discussed above.    Genetic testing is rapidly advancing, and new cancer susceptibility genes will most likely be identified in the future.  Therefore, I encouraged Radhika to contact me annually or if there are changes in her personal or family history.  This may change how we assess her cancer risk, screening, and the testing we would offer.    Plan:  1. Genetic test  results and screening recommendations were discussed today.  2. She plans to follow-up with her managing physicians.  3. She should contact me annually, or sooner if her family history changes.    If Radhika has any further questions, I encouraged her to contact me at [phone].    Time spent on the phone: 5 minutes.    Karina Salmon MS, [facility]  Licensed, Certified Genetic Counselor  [facility]

## 2021-04-14 NOTE — LETTER
Cancer Risk Management  Program Locations    [facility]  [facility]  [facility]  Mailing Address  Cancer Risk Management Program  [facility]  [address]  [place] [address]    New patient appointments  [phone]  April 16, 2021    Radhika Williamson  [address]  [place] [address]      Dear Radhika,    It was a pleasure speaking with you on 4/14/2021. Here is a copy of the progress note from our discussion. If you have any additional questions, please feel free to call.    Referring Provider: Cindy Alves MD    Presenting Information:  I spoke to Radhika by phone today to discuss her genetic testing results as part of the Cancer Risk Management Program.    Genetic Testing Result: NEGATIVE  No pathogenic mutations, variants of unknown significance, or gross deletions or duplications were detected.     Genes Analyzed (36 total): APC, MUNIR, AXIN2, BARD1, BMPR1A, BRCA1, BRCA2, BRIP1, CDH1, CDK4, CDKN2A, CHEK2, DICER1, MLH1, MSH2, MSH3, MSH6, MUTYH, NBN, NF1, NTHL1, PALB2, PMS2, PTEN, RAD51C, RAD51D, RECQL, SMAD4, SMARCA4, STK11 and TP53 (sequencing and deletion/duplication); HOXB13, POLD1 and POLE (sequencing only); EPCAM and GREM1 (deletion/duplication only).    Interpretation:  We discussed several different interpretations of this negative test result.    1. One explanation may be that there is a different gene or combination of genes and environment that are associated with the cancers in this family.  2. It is possible that a cancer susceptibility gene may be present in Radhika's family which she did not inherit.    3. There is also a small possibility that there is a mutation in one of these genes, and the testing laboratory could not find it with their current testing methods.       Screening:  Based on this negative test  result, it is important for Radhika and her relatives to refer back to the family history for appropriate cancer screening.      Radhika s close female relatives remain at increased risk for breast cancer given their family history. Breast cancer screening is generally recommended to begin approximately 10 years younger than the earliest age of breast cancer diagnosis in the family, or at age 40, whichever comes first. Breast screening options should be discussed with an individual's primary care provider and a genetic counselor, to determine at what age to begin screening, what screening is appropriate, and if additional screening (such as breast MRI) is necessary based on personal/family history factors.        We discussed that Radhika and her close relatives (through her paternal family) may have some increased risk for pancreatic cancer given her family history, but routine screening is typically not recommended.  She is encouraged to discuss this history with her care providers.      Other population cancer screening options, such as those recommended by the American Cancer Society and the National Comprehensive Cancer Network (NCCN), are also appropriate for Radhika and her family. These screening recommendations may change if there are changes to Radhika's personal and/or family history of cancer. Final screening recommendations should be made by each individual's managing physician.    Inheritance:  We reviewed the autosomal dominant inheritance of these 36 genes. We discussed that Radhika cannot/did not pass on an identifiable mutation in these genes to her children based on this test result.  Mutations in these genes do not skip generations.      Summary:  We do not have an explanation for Radhika's personal or family history of cancer. While no genetic changes were identified, Radhika may still be at risk for certain cancers due to family history, environmental factors, or other genetic causes not identified by  this test.  Because of that, it is important that she continue with cancer screening based on her personal and family history as discussed above.    Genetic testing is rapidly advancing, and new cancer susceptibility genes will most likely be identified in the future.  Therefore, I encouraged Radhika to contact me annually or if there are changes in her personal or family history.  This may change how we assess her cancer risk, screening, and the testing we would offer.    Plan:  1. Genetic test results and screening recommendations were discussed today.  2. She plans to follow-up with her managing physicians.  3. She should contact me annually, or sooner if her family history changes.    If Radhika has any further questions, I encouraged her to contact me at [phone].    Karina Salmon MS, CGC  Licensed, Certified Genetic Counselor  [facility]

## 2021-04-24 ENCOUNTER — HEALTH MAINTENANCE LETTER (OUTPATIENT)
Age: 81
End: 2021-04-24

## 2021-05-03 ENCOUNTER — MYC MEDICAL ADVICE (OUTPATIENT)
Dept: ONCOLOGY | Facility: CLINIC | Age: 81
End: 2021-05-03

## 2021-05-03 DIAGNOSIS — Z17.0 MALIGNANT NEOPLASM OF LOWER-OUTER QUADRANT OF LEFT BREAST OF FEMALE, ESTROGEN RECEPTOR POSITIVE (H): ICD-10-CM

## 2021-05-03 DIAGNOSIS — C50.512 MALIGNANT NEOPLASM OF LOWER-OUTER QUADRANT OF LEFT BREAST OF FEMALE, ESTROGEN RECEPTOR POSITIVE (H): ICD-10-CM

## 2021-05-03 NOTE — TELEPHONE ENCOUNTER
Last prescribing provider: Long 6/9/20    Last clinic visit date: 2/1/21 Long    Recommendations for requested medication (if none, N/A): Currently on Arimidex since 5/2016.    Next clinic visit date: 6/7/21 Dr. Alves

## 2021-05-04 RX ORDER — ANASTROZOLE 1 MG/1
1 TABLET ORAL DAILY
Qty: 90 TABLET | Refills: 3 | Status: SHIPPED | OUTPATIENT
Start: 2021-05-04 | End: 2022-05-31

## 2021-06-07 ENCOUNTER — ONCOLOGY VISIT (OUTPATIENT)
Dept: ONCOLOGY | Facility: CLINIC | Age: 81
End: 2021-06-07
Attending: INTERNAL MEDICINE
Payer: COMMERCIAL

## 2021-06-07 VITALS
SYSTOLIC BLOOD PRESSURE: 136 MMHG | RESPIRATION RATE: 16 BRPM | OXYGEN SATURATION: 97 % | BODY MASS INDEX: 24.66 KG/M2 | HEART RATE: 69 BPM | WEIGHT: 162.7 LBS | HEIGHT: 68 IN | DIASTOLIC BLOOD PRESSURE: 75 MMHG | TEMPERATURE: 98.7 F

## 2021-06-07 DIAGNOSIS — C50.512 MALIGNANT NEOPLASM OF LOWER-OUTER QUADRANT OF LEFT BREAST OF FEMALE, ESTROGEN RECEPTOR POSITIVE (H): ICD-10-CM

## 2021-06-07 DIAGNOSIS — Z12.31 ENCOUNTER FOR SCREENING MAMMOGRAM FOR MALIGNANT NEOPLASM OF BREAST: ICD-10-CM

## 2021-06-07 DIAGNOSIS — N64.4 BREAST PAIN, LEFT: Primary | ICD-10-CM

## 2021-06-07 DIAGNOSIS — Z17.0 MALIGNANT NEOPLASM OF LOWER-OUTER QUADRANT OF LEFT BREAST OF FEMALE, ESTROGEN RECEPTOR POSITIVE (H): ICD-10-CM

## 2021-06-07 PROCEDURE — G0463 HOSPITAL OUTPT CLINIC VISIT: HCPCS

## 2021-06-07 PROCEDURE — 99214 OFFICE O/P EST MOD 30 MIN: CPT | Performed by: INTERNAL MEDICINE

## 2021-06-07 ASSESSMENT — PAIN SCALES - GENERAL: PAINLEVEL: NO PAIN (0)

## 2021-06-07 ASSESSMENT — MIFFLIN-ST. JEOR: SCORE: 1261.37

## 2021-06-07 NOTE — PROGRESS NOTES
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 Williamson returns to clinic today for follow up of stage I left breast cancer currently on Arimidex since 5/2016.     She feels well. She tolerates the Amiridex well with no side effects. She is exercising frequently and taking her vit D/calcium for bone health.   No new medical concerns and otherwise review of systems negative.     She overall is feeling well.  She has had her covid vaccines now.    She is feeling well otherwise with no new medical history or family history.    She had a mammogram in February that was normal.       She has noticed over the last few weeks more tenderness in her left breast.  She does not think she has a mass, but notices more tenderness and this is different for her.      She has questions about prolonged endocrine therapy.    Review of Systems:  Patient denies fevers, chills, night sweats, unexplained weight changes, headaches, dizziness, vision or hearing changes, new lumps or bumps, chest pain, shortness of breath, cough, abdominal pain, nausea, vomiting, changes to bowel or bladder, swelling of extremities, bleeding issues, or rash.    Current Outpatient Medications   Medication Sig Dispense Refill     anastrozole (ARIMIDEX) 1 MG tablet Take 1 tablet (1 mg) by mouth daily 90 tablet 3     Biotin 5000 MCG  "CAPS        Calcium Citrate-Vitamin D (CALCIUM CITRATE+D3 PO)        Cholecalciferol (VITAMIN D3 PO) Take 2,000 Units by mouth daily        desonide (DESOWEN) 0.05 % external ointment Apply topically 2 times daily 60 g 3     famotidine (PEPCID) 20 MG tablet Take 20 mg by mouth 2 times daily       fluocinonide (LIDEX) 0.05 % external solution apply to scalp twice daily as needed       hydrocortisone 2.5 % cream Apply a thin layer to affected skin twice a day for up to  3-4 weeks face only 1 week       ketoconazole (NIZORAL) 2 % external shampoo Apply to scalp 2-3 times per week for 5 minutes, lather, and then rinse out.       melatonin 3 MG CAPS Take 3 mg by mouth as needed       Nutritional Supplements (IMMUNE ENHANCE PO) daily        omeprazole (PRILOSEC) 20 MG DR capsule Take 20 mg by mouth as needed       traZODone (DESYREL) 50 MG tablet Take 100 mg by mouth At Bedtime       Valerian 500 MG CAPS Take by mouth At Bedtime          Physical Examination:  /75 (BP Location: Right arm, Patient Position: Sitting, Cuff Size: Adult Regular)   Pulse 69   Temp 98.7  F (37.1  C) (Oral)   Resp 16   Ht 1.727 m (5' 7.99\")   Wt 73.8 kg (162 lb 11.2 oz)   SpO2 97%   BMI 24.74 kg/m    Wt Readings from Last 10 Encounters:   06/07/21 73.8 kg (162 lb 11.2 oz)   02/01/21 72.6 kg (160 lb)   06/19/20 71.8 kg (158 lb 3.2 oz)   01/13/20 74.4 kg (164 lb)   06/03/19 75.8 kg (167 lb 3.2 oz)   12/03/18 73.7 kg (162 lb 8 oz)   05/25/18 73.7 kg (162 lb 8 oz)   11/27/17 71.5 kg (157 lb 9.6 oz)   05/22/17 68.2 kg (150 lb 4.8 oz)   02/27/17 67.5 kg (148 lb 12.8 oz)     Constitutional: Well-appearing female in no acute distress.  Eyes: EOMI, PERRL. No scleral icterus.  ENT: Oral mucosa is moist without lesions or thrush.   Lymphatic: Neck is supple without cervical or supraclavicular lymphadenopathy. No axillary lymphadenopathy.  Breast:   No palpable abnormalities in left or right breast. No other skin concerns on either " breast.  Cardiovascular: Regular rate and rhythm. No murmurs, gallops, or rubs. No peripheral edema.  Respiratory: Clear to auscultation bilaterally. No wheezes or crackles.  Gastrointestinal: Bowel sounds present. Abdomen soft, non-tender. No palpable hepatosplenomegaly or masses.   Neurologic: Cranial nerves II through XII are grossly intact.  Skin: No rashes, petechiae, or bruising noted on exposed skin.    Laboratory Data:  Reviewed outside labs and imaging; mammogram in February benign      Assessment and Plan:  81-year-old female with history of invasive ductal carcinoma left breast T1c N0, ER positive, MS positive, HER2 negative (equivocal by FISH, and 1+ by IHC).  Oncotype score was 19 (risk of distant recurrence assuming she takes tamoxifen for 5 years of 12% with tamoxifen alone). Underwent lumpectomy w/ negative SLND followed by XRT. Currently on Arimidex since 5/2016.      Mammogram was normal in February.  We discussed given her discomfort, I would favor she get diagnostic mammogram and US.      We reviewed her original oncotype dx of 19 as well as her breast cancer index.  Breast cancer index demonstrated a risk of late recurrence of 7%, and there is a potential for benefit from longterm benefit from endocrine therapy.      We discussed the pros and cons of prolonged endocrine therapy, briefly discussing the MA17 results, outlining that some patients can get a benefit with 10 vs 5 years of an AI.   We reviewed the [employer]CI information above.  Ultimately given her low risk of recurrence, I would favor she discontinue her endocrine therapy.  We decided we would retalk about this after her mammogram.       Bone health: Underwent DEXA 5/2019 with no signs of osteopenia or osteoporosis.   Will continue with DEXA every 2 years. She was counseled regarding continuation of vitamin D and calcium supplementation in the setting of Arimidex use. Continue exercising.  Dexa in May.    HTN - improved.  She is exercising  regularly.      MD Cindy Morin MD

## 2021-06-07 NOTE — LETTER
6/7/2021         RE: Radhika Williamson  [address]  [place] [address]        Dear Colleague,    Thank you for referring your patient, Radhika Williamson, to the [facility]. Please see a copy of my visit note below.    Oncology/Hematology Visit Note  Jun 7, 2021    Reason for Visit: Follow up of breast cancer    Oncologic history summary:  Oncologic diagnosis and prognostic factors:  Postmenopausal, left breast invasive ductal carcinoma, ER/KS+ HER2 negative, stage IA, Dx in 12/2015  - Abnormal screening mammography in 12/2015, Bx confirmed invasive ductal carcinoma, grade 2, associated w/ DCIS, ER/KS+, HER2 negative  - Lumpectomy w/ SLND on 12/24/16; invasive ductal carcinoma, grade 3, 1.7x1.7x1.6 cm, margin negative, ER+ 90%, KS+ 70%, HER2 negative by IHC, negative LN; pT1cN0  - Complete adjuvant radiation   - Oncotype Dx score 19    Treatment:  Arimidex started 5/7/16-    Interval History:  Ms. Williamson returns to clinic today for follow up of stage I left breast cancer currently on Arimidex since 5/2016.     She feels well. She tolerates the Amiridex well with no side effects. She is exercising frequently and taking her vit D/calcium for bone health.   No new medical concerns and otherwise review of systems negative.     She overall is feeling well.  She has had her covid vaccines now.    She is feeling well otherwise with no new medical history or family history.    She had a mammogram in February that was normal.       She has noticed over the last few weeks more tenderness in her left breast.  She does not think she has a mass, but notices more tenderness and this is different for her.      She has questions about prolonged endocrine therapy.    Review of Systems:  Patient denies fevers, chills, night sweats, unexplained weight changes, headaches, dizziness, vision or hearing changes, new lumps or bumps, chest pain, shortness of breath, cough, abdominal pain, nausea, vomiting,  "changes to bowel or bladder, swelling of extremities, bleeding issues, or rash.    Current Outpatient Medications   Medication Sig Dispense Refill     anastrozole (ARIMIDEX) 1 MG tablet Take 1 tablet (1 mg) by mouth daily 90 tablet 3     Biotin 5000 MCG CAPS        Calcium Citrate-Vitamin D (CALCIUM CITRATE+D3 PO)        Cholecalciferol (VITAMIN D3 PO) Take 2,000 Units by mouth daily        desonide (DESOWEN) 0.05 % external ointment Apply topically 2 times daily 60 g 3     famotidine (PEPCID) 20 MG tablet Take 20 mg by mouth 2 times daily       fluocinonide (LIDEX) 0.05 % external solution apply to scalp twice daily as needed       hydrocortisone 2.5 % cream Apply a thin layer to affected skin twice a day for up to  3-4 weeks face only 1 week       ketoconazole (NIZORAL) 2 % external shampoo Apply to scalp 2-3 times per week for 5 minutes, lather, and then rinse out.       melatonin 3 MG CAPS Take 3 mg by mouth as needed       Nutritional Supplements (IMMUNE ENHANCE PO) daily        omeprazole (PRILOSEC) 20 MG DR capsule Take 20 mg by mouth as needed       traZODone (DESYREL) 50 MG tablet Take 100 mg by mouth At Bedtime       Valerian 500 MG CAPS Take by mouth At Bedtime          Physical Examination:  /75 (BP Location: Right arm, Patient Position: Sitting, Cuff Size: Adult Regular)   Pulse 69   Temp 98.7  F (37.1  C) (Oral)   Resp 16   Ht 1.727 m (5' 7.99\")   Wt 73.8 kg (162 lb 11.2 oz)   SpO2 97%   BMI 24.74 kg/m    Wt Readings from Last 10 Encounters:   06/07/21 73.8 kg (162 lb 11.2 oz)   02/01/21 72.6 kg (160 lb)   06/19/20 71.8 kg (158 lb 3.2 oz)   01/13/20 74.4 kg (164 lb)   06/03/19 75.8 kg (167 lb 3.2 oz)   12/03/18 73.7 kg (162 lb 8 oz)   05/25/18 73.7 kg (162 lb 8 oz)   11/27/17 71.5 kg (157 lb 9.6 oz)   05/22/17 68.2 kg (150 lb 4.8 oz)   02/27/17 67.5 kg (148 lb 12.8 oz)     Constitutional: Well-appearing female in no acute distress.  Eyes: EOMI, PERRL. No scleral icterus.  ENT: Oral mucosa " is moist without lesions or thrush.   Lymphatic: Neck is supple without cervical or supraclavicular lymphadenopathy. No axillary lymphadenopathy.  Breast:   No palpable abnormalities in left or right breast. No other skin concerns on either breast.  Cardiovascular: Regular rate and rhythm. No murmurs, gallops, or rubs. No peripheral edema.  Respiratory: Clear to auscultation bilaterally. No wheezes or crackles.  Gastrointestinal: Bowel sounds present. Abdomen soft, non-tender. No palpable hepatosplenomegaly or masses.   Neurologic: Cranial nerves II through XII are grossly intact.  Skin: No rashes, petechiae, or bruising noted on exposed skin.    Laboratory Data:  Reviewed outside labs and imaging; mammogram in February benign      Assessment and Plan:  81-year-old female with history of invasive ductal carcinoma left breast T1c N0, ER positive, KS positive, HER2 negative (equivocal by FISH, and 1+ by IHC).  Oncotype score was 19 (risk of distant recurrence assuming she takes tamoxifen for 5 years of 12% with tamoxifen alone). Underwent lumpectomy w/ negative SLND followed by XRT. Currently on Arimidex since 5/2016.      Mammogram was normal in February.  We discussed given her discomfort, I would favor she get diagnostic mammogram and US.      We reviewed her original oncotype dx of 19 as well as her breast cancer index.  Breast cancer index demonstrated a risk of late recurrence of 7%, and there is a potential for benefit from longterm benefit from endocrine therapy.      We discussed the pros and cons of prolonged endocrine therapy, briefly discussing the MA17 results, outlining that some patients can get a benefit with 10 vs 5 years of an AI.   We reviewed the BCI information above.  Ultimately given her low risk of recurrence, I would favor she discontinue her endocrine therapy.  We decided we would retalk about this after her mammogram.       Bone health: Underwent DEXA 5/2019 with no signs of osteopenia or  osteoporosis.   Will continue with DEXA every 2 years. She was counseled regarding continuation of vitamin D and calcium supplementation in the setting of Arimidex use. Continue exercising.  Dexa in May.    HTN - improved.  She is exercising regularly.      Cindy Alves MD

## 2021-06-07 NOTE — NURSING NOTE
"Oncology Rooming Note    June 7, 2021 3:33 PM   Radhika Williamson is a 81 year old female who presents for:    Chief Complaint   Patient presents with     Oncology Clinic Visit     BREAST CANCER     Initial Vitals: /75 (BP Location: Right arm, Patient Position: Sitting, Cuff Size: Adult Regular)   Pulse 69   Temp 98.7  F (37.1  C) (Oral)   Resp 16   Ht 1.727 m (5' 7.99\")   Wt 73.8 kg (162 lb 11.2 oz)   SpO2 97%   BMI 24.74 kg/m   Estimated body mass index is 24.74 kg/m  as calculated from the following:    Height as of this encounter: 1.727 m (5' 7.99\").    Weight as of this encounter: 73.8 kg (162 lb 11.2 oz). Body surface area is 1.88 meters squared.  No Pain (0) Comment: Data Unavailable   No LMP recorded. Patient is postmenopausal.  Allergies reviewed: Yes  Medications reviewed: Yes    Medications: Medication refills not needed today.  Pharmacy name entered into [employer]: [facility] #[id number] - [place], [place] - [address] AT [facility] [address]    Clinical concerns: Twinges in breast.        Bhavya Tafoya CMA              "

## 2021-06-15 ENCOUNTER — ANCILLARY PROCEDURE (OUTPATIENT)
Dept: MAMMOGRAPHY | Facility: CLINIC | Age: 81
End: 2021-06-15
Attending: INTERNAL MEDICINE
Payer: COMMERCIAL

## 2021-06-15 DIAGNOSIS — C50.512 MALIGNANT NEOPLASM OF LOWER-OUTER QUADRANT OF LEFT BREAST OF FEMALE, ESTROGEN RECEPTOR POSITIVE (H): ICD-10-CM

## 2021-06-15 DIAGNOSIS — Z17.0 MALIGNANT NEOPLASM OF LOWER-OUTER QUADRANT OF LEFT BREAST OF FEMALE, ESTROGEN RECEPTOR POSITIVE (H): ICD-10-CM

## 2021-06-15 DIAGNOSIS — N64.4 BREAST PAIN, LEFT: ICD-10-CM

## 2021-06-15 PROCEDURE — 76642 ULTRASOUND BREAST LIMITED: CPT | Mod: LT | Performed by: RADIOLOGY

## 2021-06-15 PROCEDURE — G0279 TOMOSYNTHESIS, MAMMO: HCPCS | Performed by: RADIOLOGY

## 2021-06-15 PROCEDURE — 77065 DX MAMMO INCL CAD UNI: CPT | Performed by: RADIOLOGY

## 2021-10-04 ENCOUNTER — HEALTH MAINTENANCE LETTER (OUTPATIENT)
Age: 81
End: 2021-10-04

## 2022-02-28 ENCOUNTER — ANCILLARY PROCEDURE (OUTPATIENT)
Dept: MAMMOGRAPHY | Facility: CLINIC | Age: 82
End: 2022-02-28
Attending: INTERNAL MEDICINE
Payer: COMMERCIAL

## 2022-02-28 DIAGNOSIS — Z17.0 MALIGNANT NEOPLASM OF LOWER-OUTER QUADRANT OF LEFT BREAST OF FEMALE, ESTROGEN RECEPTOR POSITIVE (H): ICD-10-CM

## 2022-02-28 DIAGNOSIS — C50.512 MALIGNANT NEOPLASM OF LOWER-OUTER QUADRANT OF LEFT BREAST OF FEMALE, ESTROGEN RECEPTOR POSITIVE (H): ICD-10-CM

## 2022-02-28 DIAGNOSIS — Z12.31 ENCOUNTER FOR SCREENING MAMMOGRAM FOR MALIGNANT NEOPLASM OF BREAST: ICD-10-CM

## 2022-02-28 DIAGNOSIS — Z12.31 VISIT FOR SCREENING MAMMOGRAM: ICD-10-CM

## 2022-02-28 PROCEDURE — 77063 BREAST TOMOSYNTHESIS BI: CPT | Mod: GC | Performed by: STUDENT IN AN ORGANIZED HEALTH CARE EDUCATION/TRAINING PROGRAM

## 2022-02-28 PROCEDURE — 77067 SCR MAMMO BI INCL CAD: CPT | Mod: GC | Performed by: STUDENT IN AN ORGANIZED HEALTH CARE EDUCATION/TRAINING PROGRAM

## 2022-03-07 ENCOUNTER — ONCOLOGY VISIT (OUTPATIENT)
Dept: ONCOLOGY | Facility: CLINIC | Age: 82
End: 2022-03-07
Attending: INTERNAL MEDICINE
Payer: COMMERCIAL

## 2022-03-07 VITALS
WEIGHT: 157.5 LBS | BODY MASS INDEX: 23.95 KG/M2 | DIASTOLIC BLOOD PRESSURE: 78 MMHG | OXYGEN SATURATION: 100 % | HEART RATE: 61 BPM | SYSTOLIC BLOOD PRESSURE: 138 MMHG | TEMPERATURE: 98.3 F

## 2022-03-07 DIAGNOSIS — Z79.811 LONG TERM (CURRENT) USE OF AROMATASE INHIBITORS: ICD-10-CM

## 2022-03-07 DIAGNOSIS — C50.912 MALIGNANT NEOPLASM OF LEFT BREAST IN FEMALE, ESTROGEN RECEPTOR POSITIVE, UNSPECIFIED SITE OF BREAST (H): ICD-10-CM

## 2022-03-07 DIAGNOSIS — Z17.0 MALIGNANT NEOPLASM OF LEFT BREAST IN FEMALE, ESTROGEN RECEPTOR POSITIVE, UNSPECIFIED SITE OF BREAST (H): ICD-10-CM

## 2022-03-07 DIAGNOSIS — Z12.31 VISIT FOR SCREENING MAMMOGRAM: Primary | ICD-10-CM

## 2022-03-07 DIAGNOSIS — Z12.31 ENCOUNTER FOR SCREENING MAMMOGRAM FOR MALIGNANT NEOPLASM OF BREAST: ICD-10-CM

## 2022-03-07 PROCEDURE — G0463 HOSPITAL OUTPT CLINIC VISIT: HCPCS

## 2022-03-07 PROCEDURE — 99214 OFFICE O/P EST MOD 30 MIN: CPT | Performed by: INTERNAL MEDICINE

## 2022-03-07 ASSESSMENT — PAIN SCALES - GENERAL: PAINLEVEL: NO PAIN (0)

## 2022-03-07 NOTE — LETTER
3/7/2022         RE: Radhika Williamson  [address]  [place] [address]        Dear Colleague,    Thank you for referring your patient, Radhika Williamson, to the [facility]. Please see a copy of my visit note below.    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 Williamson returns to clinic today for follow up of stage I left breast cancer currently on Arimidex since 5/2016.     She feels well. She tolerates the Amiridex well with no side effects. She is exercising frequently and taking her vit D/calcium for bone health.   No new medical concerns and otherwise review of systems negative.     She overall is feeling well.  She has had her covid vaccines now.    She is feeling well otherwise with no new medical history or family history.    She had a mammogram in February that was normal.        She has questions about prolonged endocrine therapy.    Review of Systems:  Patient denies fevers, chills, night sweats, unexplained weight changes, headaches, dizziness, vision or hearing changes, new lumps or bumps, chest pain, shortness of breath, cough, abdominal pain, nausea, vomiting, changes to bowel or bladder, swelling of extremities, bleeding issues, or rash.    Current Outpatient Medications   Medication Sig Dispense Refill     anastrozole (ARIMIDEX)  1 MG tablet Take 1 tablet (1 mg) by mouth daily 90 tablet 3     Biotin 5000 MCG CAPS        Calcium Citrate-Vitamin D (CALCIUM CITRATE+D3 PO)        Cholecalciferol (VITAMIN D3 PO) Take 2,000 Units by mouth daily        desonide (DESOWEN) 0.05 % external ointment Apply topically 2 times daily 60 g 3     famotidine (PEPCID) 20 MG tablet Take 20 mg by mouth 2 times daily       fluocinonide (LIDEX) 0.05 % external solution apply to scalp twice daily as needed       hydrocortisone 2.5 % cream Apply a thin layer to affected skin twice a day for up to  3-4 weeks face only 1 week       ketoconazole (NIZORAL) 2 % external shampoo Apply to scalp 2-3 times per week for 5 minutes, lather, and then rinse out.       melatonin 3 MG CAPS Take 3 mg by mouth as needed       Nutritional Supplements (IMMUNE ENHANCE PO) daily        omeprazole (PRILOSEC) 20 MG DR capsule Take 20 mg by mouth as needed       traZODone (DESYREL) 50 MG tablet Take 100 mg by mouth At Bedtime       Valerian 500 MG CAPS Take by mouth At Bedtime          Physical Examination:  There were no vitals taken for this visit.  Wt Readings from Last 10 Encounters:   06/07/21 73.8 kg (162 lb 11.2 oz)   02/01/21 72.6 kg (160 lb)   06/19/20 71.8 kg (158 lb 3.2 oz)   01/13/20 74.4 kg (164 lb)   06/03/19 75.8 kg (167 lb 3.2 oz)   12/03/18 73.7 kg (162 lb 8 oz)   05/25/18 73.7 kg (162 lb 8 oz)   11/27/17 71.5 kg (157 lb 9.6 oz)   05/22/17 68.2 kg (150 lb 4.8 oz)   02/27/17 67.5 kg (148 lb 12.8 oz)     Constitutional: Well-appearing female in no acute distress.  Eyes: EOMI, PERRL. No scleral icterus.  ENT: Oral mucosa is moist without lesions or thrush.   Lymphatic: Neck is supple without cervical or supraclavicular lymphadenopathy. No axillary lymphadenopathy.  Breast:   No palpable abnormalities in left or right breast. No other skin concerns on either breast.  Cardiovascular: Regular rate and rhythm. No murmurs, gallops, or rubs. No peripheral edema.  Respiratory: Clear  to auscultation bilaterally. No wheezes or crackles.  Gastrointestinal: Bowel sounds present. Abdomen soft, non-tender. No palpable hepatosplenomegaly or masses.   Neurologic: Cranial nerves II through XII are grossly intact.  Skin: No rashes, petechiae, or bruising noted on exposed skin.    Laboratory Data:  Reviewed outside labs and imaging; mammogram in February 2/28/22 benign    IMPRESSION: ACR BI-RADS Category 2: Benign     RECOMMENDED FOLLOW-UP: Annual routine screening mammogram    Assessment and Plan:  81-year-old female with history of invasive ductal carcinoma left breast T1c N0, ER positive, WI positive, HER2 negative (equivocal by FISH, and 1+ by IHC).  Oncotype score was 19 (risk of distant recurrence assuming she takes tamoxifen for 5 years of 12% with tamoxifen alone). Underwent lumpectomy w/ negative SLND followed by XRT. Currently on Arimidex since 5/2016.      Mammogram was normal in February.     We reviewed her original oncotype dx of 19 as well as her breast cancer index.  Breast cancer index demonstrated a risk of late recurrence of 7%, and there is a potential for benefit from longterm benefit from endocrine therapy.      We discussed the pros and cons of prolonged endocrine therapy, briefly discussing the MA17 results, outlining that some patients can get a benefit with 10 vs 5 years of an AI.   We reviewed the BCI information above.  Ultimately given her low risk of recurrence, I would favor she discontinue her endocrine therapy.  She has already been on this for six years.  We discussed there is also data supporting seven years of antiestrogen therapy.  She would like to continue for one more year at which time we will obtain a dexa and discuss whether to continue antiestrogen therapy.       Bone health: Underwent DEXA 5/2019 with no signs of osteopenia or osteoporosis.  She had another dexa in May 2021 -   Will continue with DEXA every 2 years. She was counseled regarding continuation of  vitamin D and calcium supplementation in the setting of Arimidex use. Continue exercising.    HTN - improved.  She is exercising regularly.  We reviewed recent articles from [facility] Cardiooncology outlining the importance of monitoring CV risk factors as she ages.      Cindy Alves MD

## 2022-03-07 NOTE — NURSING NOTE
"Oncology Rooming Note    March 7, 2022 2:40 PM   Radhika Williamson is a 81 year old female who presents for:    Chief Complaint   Patient presents with     Oncology Clinic Visit     rtn for breast cancer     Initial Vitals: BP (!) 144/76   Pulse 53   Temp 98.3  F (36.8  C) (Oral)   Wt 71.4 kg (157 lb 8 oz)   SpO2 100%   BMI 23.95 kg/m   Estimated body mass index is 23.95 kg/m  as calculated from the following:    Height as of 6/7/21: 1.727 m (5' 7.99\").    Weight as of this encounter: 71.4 kg (157 lb 8 oz). Body surface area is 1.85 meters squared.  No Pain (0) Comment: Data Unavailable   No LMP recorded. Patient is postmenopausal.  Allergies reviewed: Yes  Medications reviewed: Yes    Medications: Medication refills not needed today.  Pharmacy name entered into [employer]: [facility] #[id number] - [place], [place] - [address] [facility] [address]    Clinical concerns: none      Valerie Bustos, EMT            "

## 2022-03-07 NOTE — PROGRESS NOTES
Oncology/Hematology Visit Note  Mar 7, 2022    Reason for Visit: Follow up of breast cancer    Oncologic history summary:  Oncologic diagnosis and prognostic factors:  Postmenopausal, left breast invasive ductal carcinoma, ER/VA+ HER2 negative, stage IA, Dx in 12/2015  - Abnormal screening mammography in 12/2015, Bx confirmed invasive ductal carcinoma, grade 2, associated w/ DCIS, ER/VA+, HER2 negative  - Lumpectomy w/ SLND on 12/24/16; invasive ductal carcinoma, grade 3, 1.7x1.7x1.6 cm, margin negative, ER+ 90%, VA+ 70%, HER2 negative by IHC, negative LN; pT1cN0  - Complete adjuvant radiation   - Oncotype Dx score 19    Treatment:  Arimidex started 5/7/16-    Interval History:  Ms. Williamson returns to clinic today for follow up of stage I left breast cancer currently on Arimidex since 5/2016.     She feels well. She tolerates the Amiridex well with no side effects. She is exercising frequently and taking her vit D/calcium for bone health.   No new medical concerns and otherwise review of systems negative.     She overall is feeling well.  She has had her covid vaccines now.    She is feeling well otherwise with no new medical history or family history.    She had a mammogram in February that was normal.        She has questions about prolonged endocrine therapy.    Review of Systems:  Patient denies fevers, chills, night sweats, unexplained weight changes, headaches, dizziness, vision or hearing changes, new lumps or bumps, chest pain, shortness of breath, cough, abdominal pain, nausea, vomiting, changes to bowel or bladder, swelling of extremities, bleeding issues, or rash.    Current Outpatient Medications   Medication Sig Dispense Refill     anastrozole (ARIMIDEX) 1 MG tablet Take 1 tablet (1 mg) by mouth daily 90 tablet 3     Biotin 5000 MCG CAPS        Calcium Citrate-Vitamin D (CALCIUM CITRATE+D3 PO)        Cholecalciferol (VITAMIN D3 PO) Take 2,000 Units by mouth daily        desonide (DESOWEN) 0.05 % external  ointment Apply topically 2 times daily 60 g 3     famotidine (PEPCID) 20 MG tablet Take 20 mg by mouth 2 times daily       fluocinonide (LIDEX) 0.05 % external solution apply to scalp twice daily as needed       hydrocortisone 2.5 % cream Apply a thin layer to affected skin twice a day for up to  3-4 weeks face only 1 week       ketoconazole (NIZORAL) 2 % external shampoo Apply to scalp 2-3 times per week for 5 minutes, lather, and then rinse out.       melatonin 3 MG CAPS Take 3 mg by mouth as needed       Nutritional Supplements (IMMUNE ENHANCE PO) daily        omeprazole (PRILOSEC) 20 MG DR capsule Take 20 mg by mouth as needed       traZODone (DESYREL) 50 MG tablet Take 100 mg by mouth At Bedtime       Valerian 500 MG CAPS Take by mouth At Bedtime          Physical Examination:  There were no vitals taken for this visit.  Wt Readings from Last 10 Encounters:   06/07/21 73.8 kg (162 lb 11.2 oz)   02/01/21 72.6 kg (160 lb)   06/19/20 71.8 kg (158 lb 3.2 oz)   01/13/20 74.4 kg (164 lb)   06/03/19 75.8 kg (167 lb 3.2 oz)   12/03/18 73.7 kg (162 lb 8 oz)   05/25/18 73.7 kg (162 lb 8 oz)   11/27/17 71.5 kg (157 lb 9.6 oz)   05/22/17 68.2 kg (150 lb 4.8 oz)   02/27/17 67.5 kg (148 lb 12.8 oz)     Constitutional: Well-appearing female in no acute distress.  Eyes: EOMI, PERRL. No scleral icterus.  ENT: Oral mucosa is moist without lesions or thrush.   Lymphatic: Neck is supple without cervical or supraclavicular lymphadenopathy. No axillary lymphadenopathy.  Breast:   No palpable abnormalities in left or right breast. No other skin concerns on either breast.  Cardiovascular: Regular rate and rhythm. No murmurs, gallops, or rubs. No peripheral edema.  Respiratory: Clear to auscultation bilaterally. No wheezes or crackles.  Gastrointestinal: Bowel sounds present. Abdomen soft, non-tender. No palpable hepatosplenomegaly or masses.   Neurologic: Cranial nerves II through XII are grossly intact.  Skin: No rashes, petechiae,  or bruising noted on exposed skin.    Laboratory Data:  Reviewed outside labs and imaging; mammogram in February 2/28/22 benign    IMPRESSION: ACR BI-RADS Category 2: Benign     RECOMMENDED FOLLOW-UP: Annual routine screening mammogram    Assessment and Plan:  81-year-old female with history of invasive ductal carcinoma left breast T1c N0, ER positive, [place] positive, HER2 negative (equivocal by FISH, and 1+ by IHC).  Oncotype score was 19 (risk of distant recurrence assuming she takes tamoxifen for 5 years of 12% with tamoxifen alone). Underwent lumpectomy w/ negative SLND followed by XRT. Currently on Arimidex since 5/2016.      Mammogram was normal in February.     We reviewed her original oncotype dx of 19 as well as her breast cancer index.  Breast cancer index demonstrated a risk of late recurrence of 7%, and there is a potential for benefit from longterm benefit from endocrine therapy.      We discussed the pros and cons of prolonged endocrine therapy, briefly discussing the MA17 results, outlining that some patients can get a benefit with 10 vs 5 years of an AI.   We reviewed the BCI information above.  Ultimately given her low risk of recurrence, I would favor she discontinue her endocrine therapy.  She has already been on this for six years.  We discussed there is also data supporting seven years of antiestrogen therapy.  She would like to continue for one more year at which time we will obtain a dexa and discuss whether to continue antiestrogen therapy.       Bone health: Underwent DEXA 5/2019 with no signs of osteopenia or osteoporosis.  She had another dexa in May 2021 -   Will continue with DEXA every 2 years. She was counseled regarding continuation of vitamin D and calcium supplementation in the setting of Arimidex use. Continue exercising.    HTN - improved.  She is exercising regularly.  We reviewed recent articles from [facility] Cardiooncology outlining the importance of monitoring CV risk factors as she  ages.      Cindy Alves MD

## 2022-05-15 ENCOUNTER — HEALTH MAINTENANCE LETTER (OUTPATIENT)
Age: 82
End: 2022-05-15

## 2022-05-31 DIAGNOSIS — Z17.0 MALIGNANT NEOPLASM OF LOWER-OUTER QUADRANT OF LEFT BREAST OF FEMALE, ESTROGEN RECEPTOR POSITIVE (H): ICD-10-CM

## 2022-05-31 DIAGNOSIS — C50.512 MALIGNANT NEOPLASM OF LOWER-OUTER QUADRANT OF LEFT BREAST OF FEMALE, ESTROGEN RECEPTOR POSITIVE (H): ICD-10-CM

## 2022-05-31 RX ORDER — ANASTROZOLE 1 MG/1
1 TABLET ORAL DAILY
Qty: 90 TABLET | Refills: 3 | Status: SHIPPED | OUTPATIENT
Start: 2022-05-31 | End: 2023-05-11

## 2022-05-31 NOTE — CONFIDENTIAL NOTE
Anastrozole refill   Last prescribing provider: DR Alves     Last clinic visit date: 3/7/22 Dr lAves     Any missed appointments or no-shows since last clinic visit?: No     Recommendations for requested medication (if none, N/A): Copied from chart note 3/7/22 Dr Alves   81-year-old female with history of invasive ductal carcinoma left breast T1c N0, ER positive, MS positive, HER2 negative (equivocal by FISH, and 1+ by IHC).  Oncotype score was 19 (risk of distant recurrence assuming she takes tamoxifen for 5 years of 12% with tamoxifen alone). Underwent lumpectomy w/ negative SLND followed by XRT. Currently on Arimidex since 5/2016.       Next clinic visit date: 3/13/23 Dr Alves     Any other pertinent information (if none, N/A): N/A

## 2022-09-11 ENCOUNTER — HEALTH MAINTENANCE LETTER (OUTPATIENT)
Age: 82
End: 2022-09-11

## 2023-03-20 ENCOUNTER — ANCILLARY PROCEDURE (OUTPATIENT)
Dept: MAMMOGRAPHY | Facility: CLINIC | Age: 83
End: 2023-03-20
Attending: INTERNAL MEDICINE
Payer: COMMERCIAL

## 2023-03-20 PROCEDURE — 77067 SCR MAMMO BI INCL CAD: CPT | Mod: GC | Performed by: RADIOLOGY

## 2023-03-20 PROCEDURE — 77063 BREAST TOMOSYNTHESIS BI: CPT | Mod: GC | Performed by: RADIOLOGY

## 2023-03-27 ENCOUNTER — ONCOLOGY VISIT (OUTPATIENT)
Dept: ONCOLOGY | Facility: CLINIC | Age: 83
End: 2023-03-27
Attending: INTERNAL MEDICINE
Payer: COMMERCIAL

## 2023-03-27 ENCOUNTER — ANCILLARY PROCEDURE (OUTPATIENT)
Dept: MAMMOGRAPHY | Facility: CLINIC | Age: 83
End: 2023-03-27
Payer: COMMERCIAL

## 2023-03-27 VITALS
OXYGEN SATURATION: 99 % | SYSTOLIC BLOOD PRESSURE: 146 MMHG | BODY MASS INDEX: 24.62 KG/M2 | HEART RATE: 61 BPM | WEIGHT: 161.9 LBS | TEMPERATURE: 98 F | DIASTOLIC BLOOD PRESSURE: 72 MMHG

## 2023-03-27 DIAGNOSIS — R92.8 ABNORMAL MAMMOGRAM OF RIGHT BREAST: Primary | ICD-10-CM

## 2023-03-27 DIAGNOSIS — R92.8 ABNORMAL MAMMOGRAM OF RIGHT BREAST: ICD-10-CM

## 2023-03-27 DIAGNOSIS — C50.912 MALIGNANT NEOPLASM OF LEFT BREAST IN FEMALE, ESTROGEN RECEPTOR POSITIVE, UNSPECIFIED SITE OF BREAST (H): ICD-10-CM

## 2023-03-27 DIAGNOSIS — Z17.0 MALIGNANT NEOPLASM OF LEFT BREAST IN FEMALE, ESTROGEN RECEPTOR POSITIVE, UNSPECIFIED SITE OF BREAST (H): ICD-10-CM

## 2023-03-27 DIAGNOSIS — Z79.811 LONG TERM (CURRENT) USE OF AROMATASE INHIBITORS: ICD-10-CM

## 2023-03-27 PROCEDURE — 77065 DX MAMMO INCL CAD UNI: CPT | Mod: RT

## 2023-03-27 PROCEDURE — 99213 OFFICE O/P EST LOW 20 MIN: CPT | Performed by: INTERNAL MEDICINE

## 2023-03-27 PROCEDURE — 76642 ULTRASOUND BREAST LIMITED: CPT | Mod: RT

## 2023-03-27 PROCEDURE — G0463 HOSPITAL OUTPT CLINIC VISIT: HCPCS | Performed by: INTERNAL MEDICINE

## 2023-03-27 PROCEDURE — G0279 TOMOSYNTHESIS, MAMMO: HCPCS | Mod: GC

## 2023-03-27 ASSESSMENT — PAIN SCALES - GENERAL: PAINLEVEL: NO PAIN (0)

## 2023-03-27 NOTE — NURSING NOTE
"Oncology Rooming Note    March 27, 2023 9:23 AM   Radhika Williamson is a 83 year old female who presents for:    Chief Complaint   Patient presents with     Oncology Clinic Visit     Malignant neoplasm of left breast      Initial Vitals: BP (!) 146/72 (BP Location: Right arm, Patient Position: Sitting, Cuff Size: Adult Regular)   Pulse 61   Temp 98  F (36.7  C) (Oral)   Wt 73.4 kg (161 lb 14.4 oz)   SpO2 99%   BMI 24.62 kg/m   Estimated body mass index is 24.62 kg/m  as calculated from the following:    Height as of 6/7/21: 1.727 m (5' 7.99\").    Weight as of this encounter: 73.4 kg (161 lb 14.4 oz). Body surface area is 1.88 meters squared.  No Pain (0) Comment: Data Unavailable   No LMP recorded. Patient is postmenopausal.  Allergies reviewed: Yes  Medications reviewed: Yes    Medications: MEDICATION REFILLS NEEDED TODAY. Provider was notified.  Pharmacy name entered into [employer]: [facility] #[id number] - [place], [place] - [address] AT [facility] [address]    Clinical concerns: needs refills for anastrazol.       Peter Valentin            "

## 2023-03-27 NOTE — LETTER
3/27/2023         RE: Radhika Williamson  [address]  [facility] [address]        Dear Colleague,    Thank you for referring your patient, Radhika Williamson, to the [facility]. Please see a copy of my visit note below.    Oncology/Hematology Visit Note  Mar 27, 2023    Reason for Visit: Follow up of breast cancer    Oncologic history summary:  Oncologic diagnosis and prognostic factors:  Postmenopausal, left breast invasive ductal carcinoma, ER/WV+ HER2 negative, stage IA, Dx in 12/2015  - Abnormal screening mammography in 12/2015, Bx confirmed invasive ductal carcinoma, grade 2, associated w/ DCIS, ER/WV+, HER2 negative  - Lumpectomy w/ SLND on 12/24/16; invasive ductal carcinoma, grade 3, 1.7x1.7x1.6 cm, margin negative, ER+ 90%, WV+ 70%, HER2 negative by IHC, negative LN; pT1cN0  - Complete adjuvant radiation   - Oncotype Dx score 19    Treatment:  Arimidex started 5/7/16-    Interval History:  Ms. Williamson returns to clinic today for follow up of stage I left breast cancer currently on Arimidex since 5/2016.     She feels well. She tolerates the Amiridex well with no side effects. She is exercising frequently and taking her vit D/calcium for bone health.   No new medical concerns and otherwise review of systems negative.      She had her mammogram last week and unfortunately was found to need more imaging; this is scheduled later today.    She is feeling well otherwise with no new medical history or family history.        She has questions about prolonged endocrine therapy.    Review of Systems:  Patient denies fevers, chills, night sweats, unexplained weight changes, headaches, dizziness, vision or hearing changes, new lumps or bumps, chest pain, shortness of breath, cough, abdominal pain, nausea, vomiting, changes to bowel or bladder, swelling of extremities, bleeding issues, or rash.    Current Outpatient Medications   Medication Sig Dispense Refill     anastrozole (ARIMIDEX) 1  MG tablet Take 1 tablet (1 mg) by mouth daily 90 tablet 3     Biotin 5000 MCG CAPS        Calcium Citrate-Vitamin D (CALCIUM CITRATE+D3 PO)        Cholecalciferol (VITAMIN D3 PO) Take 2,000 Units by mouth daily        desonide (DESOWEN) 0.05 % external ointment Apply topically 2 times daily 60 g 3     famotidine (PEPCID) 20 MG tablet Take 20 mg by mouth 2 times daily       fluocinonide (LIDEX) 0.05 % external solution apply to scalp twice daily as needed       hydrocortisone 2.5 % cream Apply a thin layer to affected skin twice a day for up to  3-4 weeks face only 1 week       ketoconazole (NIZORAL) 2 % external shampoo Apply to scalp 2-3 times per week for 5 minutes, lather, and then rinse out.       melatonin 3 MG CAPS Take 5 mg by mouth as needed Time release       Nutritional Supplements (IMMUNE ENHANCE PO) daily        omeprazole (PRILOSEC) 20 MG DR capsule Take 20 mg by mouth as needed       traZODone (DESYREL) 50 MG tablet Take 100 mg by mouth At Bedtime         Physical Examination:  BP (!) 146/72 (BP Location: Right arm, Patient Position: Sitting, Cuff Size: Adult Regular)   Pulse 61   Temp 98  F (36.7  C) (Oral)   Wt 73.4 kg (161 lb 14.4 oz)   SpO2 99%   BMI 24.62 kg/m    Wt Readings from Last 10 Encounters:   03/27/23 73.4 kg (161 lb 14.4 oz)   03/07/22 71.4 kg (157 lb 8 oz)   06/07/21 73.8 kg (162 lb 11.2 oz)   02/01/21 72.6 kg (160 lb)   06/19/20 71.8 kg (158 lb 3.2 oz)   01/13/20 74.4 kg (164 lb)   06/03/19 75.8 kg (167 lb 3.2 oz)   12/03/18 73.7 kg (162 lb 8 oz)   05/25/18 73.7 kg (162 lb 8 oz)   11/27/17 71.5 kg (157 lb 9.6 oz)     Constitutional: Well-appearing female in no acute distress.  Eyes: EOMI, PERRL. No scleral icterus.  ENT: Oral mucosa is moist without lesions or thrush.   Lymphatic: Neck is supple without cervical or supraclavicular lymphadenopathy. No axillary lymphadenopathy.  Breast:   No palpable abnormalities in left or right breast. No other skin concerns on either  breast.  Cardiovascular: Regular rate and rhythm. No murmurs, gallops, or rubs. No peripheral edema.  Respiratory: Clear to auscultation bilaterally. No wheezes or crackles.  Gastrointestinal: Bowel sounds present. Abdomen soft, non-tender. No palpable hepatosplenomegaly or masses.   Neurologic: Cranial nerves II through XII are grossly intact.  Skin: No rashes, petechiae, or bruising noted on exposed skin.    Laboratory Data:  Mammogram 2/28/23 -   Compared to: 02/28/2022, 02/01/2021, and 01/13/2020, 12/9/2015     Technique:  This study was evaluated with the assistance of Computer-Aided Detection.  Breast Tomosynthesis was used in interpretation.     Findings: The breasts are heterogeneously dense, which may obscure small masses.     There is a possible asymmetry in the right breast at the 3 o'clock position, posterior depth.     There are breast conservation changes in the left breast.  There is no suspicious finding of the left breast.                                                                   IMPRESSION: BI-RADS CATEGORY: 0 - Incomplete - Need Additional Imaging     RECOMMENDED FOLLOW-UP: Additional mammographic images and possible ultrasound of the right breast.    Diagnostic imaging and ultrasound scheduled later today    Assessment and Plan:  83-year-old female with history of invasive ductal carcinoma left breast T1c N0, ER positive, [place] positive, HER2 negative (equivocal by FISH, and 1+ by IHC).  Oncotype score was 19 (risk of distant recurrence assuming she takes tamoxifen for 5 years of 12% with tamoxifen alone). Underwent lumpectomy w/ negative SLND followed by XRT. Currently on Arimidex since 5/2016.      Diagnostic mammogram and ultrasound later today.  We reviewed her original oncotype dx of 19 as well as her breast cancer index.  Breast cancer index demonstrated a risk of late recurrence of 7%, and there is a potential for benefit from longterm benefit from endocrine therapy.      We discussed  the pros and cons of prolonged endocrine therapy, briefly discussing the MA17 results, outlining that some patients can get a benefit with 10 vs 5 years of an AI.   We reviewed the BCI information above.  Given her breast density, she is inclined to continue arimidex.  That being said, she would like to see her dexa scan result first, and then will decide.  She will reach out to me when this is done at [facility].       Bone health: Underwent DEXA 5/2019 with no signs of osteopenia or osteoporosis.  She had another dexa in May 2021 -   Will continue with DEXA every 2 years. She was counseled regarding continuation of vitamin D and calcium supplementation in the setting of Arimidex use. Continue exercising.    HTN - improved.  She checks this at home and it is normal.  She is exercising regularly.  We reviewed recent articles from [facility] Cardiooncology outlining the importance of monitoring CV risk factors as she ages.      Cindy Alves MD

## 2023-03-27 NOTE — PROGRESS NOTES
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 Williamson returns to clinic today for follow up of stage I left breast cancer currently on Arimidex since 5/2016.     She feels well. She tolerates the Amiridex well with no side effects. She is exercising frequently and taking her vit D/calcium for bone health.   No new medical concerns and otherwise review of systems negative.      She had her mammogram last week and unfortunately was found to need more imaging; this is scheduled later today.    She is feeling well otherwise with no new medical history or family history.        She has questions about prolonged endocrine therapy.    Review of Systems:  Patient denies fevers, chills, night sweats, unexplained weight changes, headaches, dizziness, vision or hearing changes, new lumps or bumps, chest pain, shortness of breath, cough, abdominal pain, nausea, vomiting, changes to bowel or bladder, swelling of extremities, bleeding issues, or rash.    Current Outpatient Medications   Medication Sig Dispense Refill     anastrozole (ARIMIDEX) 1 MG tablet Take 1 tablet (1 mg) by mouth daily 90 tablet 3     Biotin 5000 MCG CAPS        Calcium Citrate-Vitamin D (CALCIUM CITRATE+D3 PO)        Cholecalciferol (VITAMIN D3 PO) Take 2,000 Units by mouth daily        desonide (DESOWEN) 0.05 % external  ointment Apply topically 2 times daily 60 g 3     famotidine (PEPCID) 20 MG tablet Take 20 mg by mouth 2 times daily       fluocinonide (LIDEX) 0.05 % external solution apply to scalp twice daily as needed       hydrocortisone 2.5 % cream Apply a thin layer to affected skin twice a day for up to  3-4 weeks face only 1 week       ketoconazole (NIZORAL) 2 % external shampoo Apply to scalp 2-3 times per week for 5 minutes, lather, and then rinse out.       melatonin 3 MG CAPS Take 5 mg by mouth as needed Time release       Nutritional Supplements (IMMUNE ENHANCE PO) daily        omeprazole (PRILOSEC) 20 MG DR capsule Take 20 mg by mouth as needed       traZODone (DESYREL) 50 MG tablet Take 100 mg by mouth At Bedtime         Physical Examination:  BP (!) 146/72 (BP Location: Right arm, Patient Position: Sitting, Cuff Size: Adult Regular)   Pulse 61   Temp 98  F (36.7  C) (Oral)   Wt 73.4 kg (161 lb 14.4 oz)   SpO2 99%   BMI 24.62 kg/m    Wt Readings from Last 10 Encounters:   03/27/23 73.4 kg (161 lb 14.4 oz)   03/07/22 71.4 kg (157 lb 8 oz)   06/07/21 73.8 kg (162 lb 11.2 oz)   02/01/21 72.6 kg (160 lb)   06/19/20 71.8 kg (158 lb 3.2 oz)   01/13/20 74.4 kg (164 lb)   06/03/19 75.8 kg (167 lb 3.2 oz)   12/03/18 73.7 kg (162 lb 8 oz)   05/25/18 73.7 kg (162 lb 8 oz)   11/27/17 71.5 kg (157 lb 9.6 oz)     Constitutional: Well-appearing female in no acute distress.  Eyes: EOMI, PERRL. No scleral icterus.  ENT: Oral mucosa is moist without lesions or thrush.   Lymphatic: Neck is supple without cervical or supraclavicular lymphadenopathy. No axillary lymphadenopathy.  Breast:   No palpable abnormalities in left or right breast. No other skin concerns on either breast.  Cardiovascular: Regular rate and rhythm. No murmurs, gallops, or rubs. No peripheral edema.  Respiratory: Clear to auscultation bilaterally. No wheezes or crackles.  Gastrointestinal: Bowel sounds present. Abdomen soft, non-tender. No palpable  hepatosplenomegaly or masses.   Neurologic: Cranial nerves II through XII are grossly intact.  Skin: No rashes, petechiae, or bruising noted on exposed skin.    Laboratory Data:  Mammogram 2/28/23 -   Compared to: 02/28/2022, 02/01/2021, and 01/13/2020, 12/9/2015     Technique:  This study was evaluated with the assistance of Computer-Aided Detection.  Breast Tomosynthesis was used in interpretation.     Findings: The breasts are heterogeneously dense, which may obscure small masses.     There is a possible asymmetry in the right breast at the 3 o'clock position, posterior depth.     There are breast conservation changes in the left breast.  There is no suspicious finding of the left breast.                                                                   IMPRESSION: BI-RADS CATEGORY: 0 - Incomplete - Need Additional Imaging     RECOMMENDED FOLLOW-UP: Additional mammographic images and possible ultrasound of the right breast.    Diagnostic imaging and ultrasound scheduled later today    Assessment and Plan:  83-year-old female with history of invasive ductal carcinoma left breast T1c N0, ER positive, [place] positive, HER2 negative (equivocal by FISH, and 1+ by IHC).  Oncotype score was 19 (risk of distant recurrence assuming she takes tamoxifen for 5 years of 12% with tamoxifen alone). Underwent lumpectomy w/ negative SLND followed by XRT. Currently on Arimidex since 5/2016.      Diagnostic mammogram and ultrasound later today.  We reviewed her original oncotype dx of 19 as well as her breast cancer index.  Breast cancer index demonstrated a risk of late recurrence of 7%, and there is a potential for benefit from longterm benefit from endocrine therapy.      We discussed the pros and cons of prolonged endocrine therapy, briefly discussing the MA17 results, outlining that some patients can get a benefit with 10 vs 5 years of an AI.   We reviewed the BCI information above.  Given her breast density, she is inclined to  continue arimidex.  That being said, she would like to see her dexa scan result first, and then will decide.  She will reach out to me when this is done at [facility].       Bone health: Underwent DEXA 5/2019 with no signs of osteopenia or osteoporosis.  She had another dexa in May 2021 -   Will continue with DEXA every 2 years. She was counseled regarding continuation of vitamin D and calcium supplementation in the setting of Arimidex use. Continue exercising.    HTN - improved.  She checks this at home and it is normal.  She is exercising regularly.  We reviewed recent articles from [facility] Cardiooncology outlining the importance of monitoring CV risk factors as she ages.      Cindy Alves MD

## 2023-05-02 ENCOUNTER — PATIENT OUTREACH (OUTPATIENT)
Dept: ONCOLOGY | Facility: CLINIC | Age: 83
End: 2023-05-02
Payer: COMMERCIAL

## 2023-05-02 NOTE — PROGRESS NOTES
[facility]: Cancer Care                                                                                            Patient called to update us that her Dexa Scan results are complete from . The results are in Care Everywhere.     Two years ago she had BCI done indicating a benefit of continuing AI treatment, she wonders if this is the same recommendations based on new research.     She would asked for Dr Alves to review her Dexa results and provide update recommendations and newest research results.     Jacqueline Freeman MSN, RN ,OCN  Lead RN Care Coordinator - [facility]  [phone]

## 2023-05-11 ENCOUNTER — PATIENT OUTREACH (OUTPATIENT)
Dept: ONCOLOGY | Facility: CLINIC | Age: 83
End: 2023-05-11
Payer: COMMERCIAL

## 2023-05-11 DIAGNOSIS — Z17.0 MALIGNANT NEOPLASM OF LOWER-OUTER QUADRANT OF LEFT BREAST OF FEMALE, ESTROGEN RECEPTOR POSITIVE (H): ICD-10-CM

## 2023-05-11 DIAGNOSIS — C50.512 MALIGNANT NEOPLASM OF LOWER-OUTER QUADRANT OF LEFT BREAST OF FEMALE, ESTROGEN RECEPTOR POSITIVE (H): ICD-10-CM

## 2023-05-11 RX ORDER — ANASTROZOLE 1 MG/1
1 TABLET ORAL DAILY
Qty: 90 TABLET | Refills: 3 | Status: SHIPPED | OUTPATIENT
Start: 2023-05-11 | End: 2024-03-28

## 2023-05-11 NOTE — PROGRESS NOTES
[facility]: Cancer Care Plan of Care Education Note                                    Discussion with Patient:                                                      Returned call to Radhika with Dr Alves recommendation to continue taking anastrozole as planned.          Assessment:                                                      Assessment completed with:: Patient    Current living arrangement       Plan of Care Education   Yearly learning assessment completed?: Yes (see Education tab)  Diagnosis:: Hx breast cancer  Does patient understand diagnosis?: Yes  Tx plan/regimen:: Anastrozole  Does patient understand treatment plan/regimen?: Yes  Plan of Care:: MD follow-up appointment    Evaluation of Learning  Patient Education Provided: Yes  Readiness:: Acceptance  Method:: Explanation  Response:: Verbalizes understanding;Demonstrates understanding        Intervention/Education provided during outreach:                                                       Answered questions to her stated satisfaction.   Refill sent to pharmacy  Patient to follow up as scheduled at next appt  Patient to [email or web address] message with updates  Confirmed patient has clinic and triage numbers    Jacqueline Freeman MSN, RN ,OCN  Lead RN Care Coordinator - [facility]  [phone]

## 2023-06-03 ENCOUNTER — HEALTH MAINTENANCE LETTER (OUTPATIENT)
Age: 83
End: 2023-06-03

## 2024-02-05 DIAGNOSIS — C50.512 MALIGNANT NEOPLASM OF LOWER-OUTER QUADRANT OF LEFT BREAST OF FEMALE, ESTROGEN RECEPTOR POSITIVE (H): Primary | ICD-10-CM

## 2024-02-05 DIAGNOSIS — Z17.0 MALIGNANT NEOPLASM OF LOWER-OUTER QUADRANT OF LEFT BREAST OF FEMALE, ESTROGEN RECEPTOR POSITIVE (H): Primary | ICD-10-CM

## 2024-02-05 DIAGNOSIS — Z12.31 ENCOUNTER FOR SCREENING MAMMOGRAM FOR MALIGNANT NEOPLASM OF BREAST: ICD-10-CM

## 2024-03-27 NOTE — PROGRESS NOTES
Oncology/Hematology Visit Note  Mar 28, 2024    Reason for Visit: Follow up of breast cancer    Oncologic history summary:  Oncologic diagnosis and prognostic factors:  Postmenopausal, left breast invasive ductal carcinoma, ER/MI+ HER2 negative, stage IA, Dx in 12/2015  - Abnormal screening mammography in 12/2015, Bx confirmed invasive ductal carcinoma, grade 2, associated w/ DCIS, ER/MI+, HER2 negative  - Lumpectomy w/ SLND on 12/24/16; invasive ductal carcinoma, grade 3, 1.7x1.7x1.6 cm, margin negative, ER+ 90%, MI+ 70%, HER2 negative by IHC, negative LN; pT1cN0  - Complete adjuvant radiation   - Oncotype Dx score 19    Treatment:  Arimidex started 5/7/16- current.     Interval History:  Jono returns clinic today for follow-up of stage I left breast cancer currently on Arimidex since 5/2016.  She denies any new concerns today.  She continues to tolerate anastrozole very well.  She denies any side effects since starting this in 2016.  She has no chest pain, shortness of breath, or cough.  No new breast concerns today.  No new bone pains.  No nausea or vomiting.  No headaches.  She remains active with swimming and yoga.  She has psoriasis for which she is following with dermatology for and was started on Otezla.     No new medical concerns and otherwise review of systems negative.     Review of Systems:   ROS: 10 point ROS neg other than the symptoms noted above in the HPI    Current Outpatient Medications   Medication Sig Dispense Refill    anastrozole (ARIMIDEX) 1 MG tablet Take 1 tablet (1 mg) by mouth daily 90 tablet 3    Biotin 5000 MCG CAPS       Calcium Citrate-Vitamin D (CALCIUM CITRATE+D3 PO)       Cholecalciferol (VITAMIN D3 PO) Take 2,000 Units by mouth daily       desonide (DESOWEN) 0.05 % external ointment Apply topically 2 times daily 60 g 3    famotidine (PEPCID) 20 MG tablet Take 20 mg by mouth 2 times daily      fluocinonide (LIDEX) 0.05 % external solution apply to scalp twice daily as needed       "hydrocortisone 2.5 % cream Apply a thin layer to affected skin twice a day for up to  3-4 weeks face only 1 week      ketoconazole (NIZORAL) 2 % external shampoo Apply to scalp 2-3 times per week for 5 minutes, lather, and then rinse out.      melatonin 3 MG CAPS Take 5 mg by mouth as needed Time release      Nutritional Supplements (IMMUNE ENHANCE PO) daily       omeprazole (PRILOSEC) 20 MG DR capsule Take 20 mg by mouth as needed      traZODone (DESYREL) 50 MG tablet Take 100 mg by mouth At Bedtime         Physical Examination:  /80 (BP Location: Right arm, Patient Position: Chair, Cuff Size: Adult Large)   Pulse 62   Temp 97.9  F (36.6  C)   Resp 16   Ht 1.716 m (5' 7.56\")   Wt 72.1 kg (158 lb 14.4 oz)   SpO2 97%   BMI 24.48 kg/m    Wt Readings from Last 10 Encounters:   03/27/23 73.4 kg (161 lb 14.4 oz)   03/07/22 71.4 kg (157 lb 8 oz)   06/07/21 73.8 kg (162 lb 11.2 oz)   02/01/21 72.6 kg (160 lb)   06/19/20 71.8 kg (158 lb 3.2 oz)   01/13/20 74.4 kg (164 lb)   06/03/19 75.8 kg (167 lb 3.2 oz)   12/03/18 73.7 kg (162 lb 8 oz)   05/25/18 73.7 kg (162 lb 8 oz)   11/27/17 71.5 kg (157 lb 9.6 oz)   General: No acute distress  HEENT: Sclera anicteric. Oral mucosa pink and moist.  No mucositis or thrush  Lymph: No lymphadenopathy in neck  Heart: Regular, rate, and rhythm  Breast:   No palpable abnormalities in left or right breast. Mild tenderness at the 12 o'clock position ~0.5 cm from the nipple areola complex without palpable mass in supine or upright position. No other skin concerns on either breast.  Lungs: Clear to ascultation bilaterally  Abdomen: Positive bowel sounds. Soft, non-distended, non-tender. No organomegaly or mass.   Extremities: no lower extremity edema  Neuro: Cranial nerves grossly intact  Rash: none    Laboratory Data:  Mammogram 03/28/2023: Findings: The breasts are heterogeneously dense, which may obscure small masses.  There is no radiographic evidence of malignancy.             "                                                        IMPRESSION: ACR BI-RADS Category 1: Negative       Diagnostic imaging and ultrasound scheduled later today    Assessment and Plan:  84 year old female with history of invasive ductal carcinoma left breast T1c N0, ER positive, [place] positive, HER2 negative (equivocal by FISH, and 1+ by IHC).  Oncotype score was 19 (risk of distant recurrence assuming she takes tamoxifen for 5 years of 12% with tamoxifen alone). Underwent lumpectomy w/ negative SLND followed by XRT. Currently on Arimidex since 5/2016.     -She underwent a screening mammogram today which was benign. She had mild tenderness at the 12 o'clock position on exam today as above without palpable masses. I personally reviewed her breast imaging together with the radiology team who does not see anything on mammogram to explain her discomfort.   - original oncotype dx of 19 as well as her breast cancer index.  Breast cancer index demonstrated a risk of late recurrence of 7%, and there is a potential for benefit from longterm benefit from endocrine therapy. She continues on arimidex at this time, planning for 10 years assuming DEXA scan continues to be stable through 05/2026.      Bone health: Underwent DEXA 5/2019 with no signs of low bone density or osteoporosis. [facility] with again no signs of low bone density or osteoporosis. Continue of vitamin D and calcium supplementation in the setting of Arimidex use. Continue exercising.    Follow up: RTC in one year with Dr. Alves or survivorship clinic with Anum Guerin.     30 minutes spent on the date of the encounter doing chart review, review of test results, interpretation of tests, patient visit, and documentation     Joyce Tipton PA-C

## 2024-03-28 ENCOUNTER — ONCOLOGY VISIT (OUTPATIENT)
Dept: ONCOLOGY | Facility: CLINIC | Age: 84
End: 2024-03-28
Payer: COMMERCIAL

## 2024-03-28 ENCOUNTER — ANCILLARY PROCEDURE (OUTPATIENT)
Dept: MAMMOGRAPHY | Facility: CLINIC | Age: 84
End: 2024-03-28
Attending: INTERNAL MEDICINE
Payer: COMMERCIAL

## 2024-03-28 VITALS
BODY MASS INDEX: 24.08 KG/M2 | HEIGHT: 68 IN | SYSTOLIC BLOOD PRESSURE: 127 MMHG | DIASTOLIC BLOOD PRESSURE: 80 MMHG | TEMPERATURE: 97.9 F | OXYGEN SATURATION: 97 % | WEIGHT: 158.9 LBS | HEART RATE: 62 BPM | RESPIRATION RATE: 16 BRPM

## 2024-03-28 DIAGNOSIS — C50.512 MALIGNANT NEOPLASM OF LOWER-OUTER QUADRANT OF LEFT BREAST OF FEMALE, ESTROGEN RECEPTOR POSITIVE (H): ICD-10-CM

## 2024-03-28 DIAGNOSIS — Z12.31 ENCOUNTER FOR SCREENING MAMMOGRAM FOR BREAST CANCER: Primary | ICD-10-CM

## 2024-03-28 DIAGNOSIS — Z17.0 MALIGNANT NEOPLASM OF LOWER-OUTER QUADRANT OF LEFT BREAST OF FEMALE, ESTROGEN RECEPTOR POSITIVE (H): ICD-10-CM

## 2024-03-28 DIAGNOSIS — Z12.31 ENCOUNTER FOR SCREENING MAMMOGRAM FOR MALIGNANT NEOPLASM OF BREAST: ICD-10-CM

## 2024-03-28 PROCEDURE — G0463 HOSPITAL OUTPT CLINIC VISIT: HCPCS

## 2024-03-28 PROCEDURE — 99214 OFFICE O/P EST MOD 30 MIN: CPT

## 2024-03-28 PROCEDURE — 77063 BREAST TOMOSYNTHESIS BI: CPT | Mod: GC | Performed by: RADIOLOGY

## 2024-03-28 PROCEDURE — 77067 SCR MAMMO BI INCL CAD: CPT | Mod: GC | Performed by: RADIOLOGY

## 2024-03-28 RX ORDER — ANASTROZOLE 1 MG/1
1 TABLET ORAL DAILY
Qty: 90 TABLET | Refills: 3 | Status: SHIPPED | OUTPATIENT
Start: 2024-03-28

## 2024-03-28 RX ORDER — APREMILAST 10-20-30MG
KIT ORAL
COMMUNITY
Start: 2024-02-27

## 2024-03-28 ASSESSMENT — PAIN SCALES - GENERAL: PAINLEVEL: NO PAIN (0)

## 2024-03-28 NOTE — NURSING NOTE
"Oncology Rooming Note    March 28, 2024 10:10 AM   Radhika Williamson is a 84 year old female who presents for:    Chief Complaint   Patient presents with    Oncology Clinic Visit     P RETURN - BREAST CANCER     Initial Vitals: /80 (BP Location: Right arm, Patient Position: Chair, Cuff Size: Adult Large)   Pulse 62   Temp 97.9  F (36.6  C)   Resp 16   Ht 1.716 m (5' 7.56\")   Wt 72.1 kg (158 lb 14.4 oz)   SpO2 97%   BMI 24.48 kg/m   Estimated body mass index is 24.48 kg/m  as calculated from the following:    Height as of this encounter: 1.716 m (5' 7.56\").    Weight as of this encounter: 72.1 kg (158 lb 14.4 oz). Body surface area is 1.85 meters squared.  No Pain (0) Comment: Data Unavailable   No LMP recorded. Patient is postmenopausal.  Allergies reviewed: Yes  Medications reviewed: Yes    Medications: Medication refills not needed today.  Pharmacy name entered into [facility] Education: [id number] - [place], [place] - [address] AT [facility] [address]    Frailty Screening:   Is the patient here for a new oncology consult visit in cancer care? 2. No    Pete Damon LPN              "

## 2024-03-28 NOTE — Clinical Note
3/28/2024         RE: Radhika Williamson  [address]  [place] [address]        Dear Colleague,    Thank you for referring your patient, Radhika Williamson, to the [facility]. Please see a copy of my visit note below.    Oncology/Hematology Visit Note  Mar 28, 2024    Reason for Visit: Follow up of breast cancer    Oncologic history summary:  Oncologic diagnosis and prognostic factors:  Postmenopausal, left breast invasive ductal carcinoma, ER/UT+ HER2 negative, stage IA, Dx in 12/2015  - Abnormal screening mammography in 12/2015, Bx confirmed invasive ductal carcinoma, grade 2, associated w/ DCIS, ER/UT+, HER2 negative  - Lumpectomy w/ SLND on 12/24/16; invasive ductal carcinoma, grade 3, 1.7x1.7x1.6 cm, margin negative, ER+ 90%, UT+ 70%, HER2 negative by IHC, negative LN; pT1cN0  - Complete adjuvant radiation   - Oncotype Dx score 19    Treatment:  Arimidex started 5/7/16- current.     Interval History:  Jono returns clinic today for follow-up of stage I left breast cancer currently on Arimidex since 5/2016.  She denies any new concerns today.  She continues to tolerate anastrozole very well.  She denies any side effects since starting this in 2016.  She has no chest pain, shortness of breath, or cough.  No new breast concerns today.  No new bone pains.  No nausea or vomiting.  No headaches.  She remains active with swimming and yoga.  She has psoriasis for which she is following with dermatology for and was started on Otezla.     No new medical concerns and otherwise review of systems negative.     Review of Systems:   ROS: 10 point ROS neg other than the symptoms noted above in the HPI    Current Outpatient Medications   Medication Sig Dispense Refill    anastrozole (ARIMIDEX) 1 MG tablet Take 1 tablet (1 mg) by mouth daily 90 tablet 3    Biotin 5000 MCG CAPS       Calcium Citrate-Vitamin D (CALCIUM CITRATE+D3 PO)       Cholecalciferol (VITAMIN D3 PO) Take 2,000 Units by mouth  "daily       desonide (DESOWEN) 0.05 % external ointment Apply topically 2 times daily 60 g 3    famotidine (PEPCID) 20 MG tablet Take 20 mg by mouth 2 times daily      fluocinonide (LIDEX) 0.05 % external solution apply to scalp twice daily as needed      hydrocortisone 2.5 % cream Apply a thin layer to affected skin twice a day for up to  3-4 weeks face only 1 week      ketoconazole (NIZORAL) 2 % external shampoo Apply to scalp 2-3 times per week for 5 minutes, lather, and then rinse out.      melatonin 3 MG CAPS Take 5 mg by mouth as needed Time release      Nutritional Supplements (IMMUNE ENHANCE PO) daily       omeprazole (PRILOSEC) 20 MG DR capsule Take 20 mg by mouth as needed      traZODone (DESYREL) 50 MG tablet Take 100 mg by mouth At Bedtime         Physical Examination:  /80 (BP Location: Right arm, Patient Position: Chair, Cuff Size: Adult Large)   Pulse 62   Temp 97.9  F (36.6  C)   Resp 16   Ht 1.716 m (5' 7.56\")   Wt 72.1 kg (158 lb 14.4 oz)   SpO2 97%   BMI 24.48 kg/m    Wt Readings from Last 10 Encounters:   03/27/23 73.4 kg (161 lb 14.4 oz)   03/07/22 71.4 kg (157 lb 8 oz)   06/07/21 73.8 kg (162 lb 11.2 oz)   02/01/21 72.6 kg (160 lb)   06/19/20 71.8 kg (158 lb 3.2 oz)   01/13/20 74.4 kg (164 lb)   06/03/19 75.8 kg (167 lb 3.2 oz)   12/03/18 73.7 kg (162 lb 8 oz)   05/25/18 73.7 kg (162 lb 8 oz)   11/27/17 71.5 kg (157 lb 9.6 oz)   General: No acute distress  HEENT: Sclera anicteric. Oral mucosa pink and moist.  No mucositis or thrush  Lymph: No lymphadenopathy in neck  Heart: Regular, rate, and rhythm  Breast:   No palpable abnormalities in left or right breast. Mild tenderness at the 12 o'clock position ~0.5 cm from the nipple areola complex without palpable mass in supine or upright position. No other skin concerns on either breast.  Lungs: Clear to ascultation bilaterally  Abdomen: Positive bowel sounds. Soft, non-distended, non-tender. No organomegaly or mass.   Extremities: no " lower extremity edema  Neuro: Cranial nerves grossly intact  Rash: none    Laboratory Data:  Mammogram 03/28/2023: Findings: The breasts are heterogeneously dense, which may obscure small masses.  There is no radiographic evidence of malignancy.                                                                    IMPRESSION: ACR BI-RADS Category 1: Negative       Diagnostic imaging and ultrasound scheduled later today    Assessment and Plan:  84 year old female with history of invasive ductal carcinoma left breast T1c N0, ER positive, WI positive, HER2 negative (equivocal by FISH, and 1+ by IHC).  Oncotype score was 19 (risk of distant recurrence assuming she takes tamoxifen for 5 years of 12% with tamoxifen alone). Underwent lumpectomy w/ negative SLND followed by XRT. Currently on Arimidex since 5/2016.     -She underwent a screening mammogram today which was benign. She had mild tenderness at the 12 o'clock position on exam today as above without palpable masses. I personally reviewed her breast imaging together with the radiology team who does not see anything on mammogram to explain her discomfort.   - original oncotype dx of 19 as well as her breast cancer index.  Breast cancer index demonstrated a risk of late recurrence of 7%, and there is a potential for benefit from longterm benefit from endocrine therapy. She continues on arimidex at this time, planning for 10 years assuming DEXA scan continues to be stable through 05/2026.      Bone health: Underwent DEXA 5/2019 with no signs of low bone density or osteoporosis. [facility]20[facility] with again no signs of low bone density or osteoporosis. Continue of vitamin D and calcium supplementation in the setting of Arimidex use. Continue exercising.    Follow up: RTC in one year with Dr. Alves or survivorship clinic with Anum Guerin.     30 minutes spent on the date of the encounter doing chart review, review of test results, interpretation of  tests, patient visit, and documentation     Joyce Tipton PA-C               Again, thank you for allowing me to participate in the care of your patient.        Sincerely,        Joyce Tipton PA-C

## 2024-07-13 ENCOUNTER — HEALTH MAINTENANCE LETTER (OUTPATIENT)
Age: 84
End: 2024-07-13

## 2024-12-12 ENCOUNTER — DOCUMENTATION ONLY (OUTPATIENT)
Dept: SLEEP MEDICINE | Facility: CLINIC | Age: 84
End: 2024-12-12
Payer: COMMERCIAL

## 2024-12-12 NOTE — PROGRESS NOTES
WALK IN TROUBLESHOOT  PT CAME TO [facility] STATING SHE THINKS HER NEW CPAP MACHINE IS DEFECTIVE.  SHE STATED SHE KEEPS HEARING AIR FROM HER WATER CHAMBER AND IT WON'T KEEP RUNNING.  RAN MACHINE AND IT HAD A GOOD SEAL.  ADDED WATER TO THE CHAMBER AS SOME PTS HAVE STATED IT DOESN'T SEAL WHEN THERE IS WATER IN IT.  WAS ABLE TO RUN MACHINE W/ WATER IN THE CHAMBER.  TOLD PT ABOUT REMOVING AND REINCERTING CHAMBER AND RUNNING FINGER AROUND SEALS.  SHE STATED SHE HAS REMOVED AND REINCERTED 20 TIMES SOMETIMES AND IT STILL WON'T SEAL.  ZERO BILLED NEW CHAMBER AND RETURNED DEFECTIVE ONE TO [facility].

## 2025-04-07 ENCOUNTER — ANCILLARY PROCEDURE (OUTPATIENT)
Dept: MAMMOGRAPHY | Facility: CLINIC | Age: 85
End: 2025-04-07
Payer: COMMERCIAL

## 2025-04-07 ENCOUNTER — ONCOLOGY VISIT (OUTPATIENT)
Dept: ONCOLOGY | Facility: CLINIC | Age: 85
End: 2025-04-07
Attending: INTERNAL MEDICINE
Payer: COMMERCIAL

## 2025-04-07 VITALS
RESPIRATION RATE: 16 BRPM | SYSTOLIC BLOOD PRESSURE: 130 MMHG | WEIGHT: 142.5 LBS | HEART RATE: 61 BPM | OXYGEN SATURATION: 99 % | TEMPERATURE: 97.6 F | DIASTOLIC BLOOD PRESSURE: 73 MMHG | BODY MASS INDEX: 22.37 KG/M2 | HEIGHT: 67 IN

## 2025-04-07 DIAGNOSIS — Z12.31 ENCOUNTER FOR SCREENING MAMMOGRAM FOR MALIGNANT NEOPLASM OF BREAST: ICD-10-CM

## 2025-04-07 DIAGNOSIS — Z17.0 MALIGNANT NEOPLASM OF LEFT BREAST IN FEMALE, ESTROGEN RECEPTOR POSITIVE, UNSPECIFIED SITE OF BREAST (H): Primary | ICD-10-CM

## 2025-04-07 DIAGNOSIS — Z12.31 VISIT FOR SCREENING MAMMOGRAM: ICD-10-CM

## 2025-04-07 DIAGNOSIS — C50.912 MALIGNANT NEOPLASM OF LEFT BREAST IN FEMALE, ESTROGEN RECEPTOR POSITIVE, UNSPECIFIED SITE OF BREAST (H): Primary | ICD-10-CM

## 2025-04-07 PROCEDURE — 99214 OFFICE O/P EST MOD 30 MIN: CPT | Performed by: INTERNAL MEDICINE

## 2025-04-07 PROCEDURE — G2211 COMPLEX E/M VISIT ADD ON: HCPCS | Performed by: INTERNAL MEDICINE

## 2025-04-07 PROCEDURE — G0463 HOSPITAL OUTPT CLINIC VISIT: HCPCS | Performed by: INTERNAL MEDICINE

## 2025-04-07 PROCEDURE — 77067 SCR MAMMO BI INCL CAD: CPT | Performed by: RADIOLOGY

## 2025-04-07 PROCEDURE — 77063 BREAST TOMOSYNTHESIS BI: CPT | Performed by: RADIOLOGY

## 2025-04-07 ASSESSMENT — PAIN SCALES - GENERAL: PAINLEVEL_OUTOF10: NO PAIN (0)

## 2025-04-07 NOTE — LETTER
4/7/2025      Radhika Williamson  [address]  [place] [address]      Dear Colleague,    Thank you for referring your patient, Radhika Williamson, to the [facility]. Please see a copy of my visit note below.    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     Interval History:  Jono returns clinic today for follow-up of stage I left breast cancer currently on Arimidex since 5/2016.  She denies any new concerns today.  She continues to tolerate anastrozole very well.  She denies any side effects since starting this in 2016.  She has no chest pain, shortness of breath, or cough.  No new breast concerns today.  No new bone pains.  No nausea or vomiting.  No headaches.  She remains active with swimming and yoga.  She recently took a three week hiking trip to [place].  It was glorious.  She physically felt very well.    Cancer Staging   Malignant neoplasm of left breast (H)  Staging form: Breast, AJCC 7th Edition  - Clinical stage from 3/24/2016: Stage IA (T1c, N0, M0)  Estrogen receptor status: Positive  Progesterone receptor status: Positive  HER2 status: Equivocal  IHC of regional lymph nodes: Not assessed  Multi-gene signature risk of recurrence: Intermediate risk      Current Therapy: [No matching plan found]  Current Disease Status : No evidence of disease  (DIANA)  ECOG Performance Status : 0 - Asymptomatic  Intent of Therapy: Curative  Intent to Change : No   No new medical concerns and otherwise review of systems negative.     Review of Systems:   ROS: 10 point ROS neg other than the symptoms noted above in the HPI    Current Outpatient Medications   Medication Sig Dispense Refill     anastrozole (ARIMIDEX) 1 MG tablet Take 1 tablet (1 mg) by mouth daily 90 tablet 3     Biotin 5000 MCG CAPS        Calcium Citrate-Vitamin D (CALCIUM CITRATE+D3 PO)        Cholecalciferol (VITAMIN D3 PO) Take 2,000 Units by mouth daily        desonide (DESOWEN) 0.05 % external ointment Apply topically 2 times daily 60 g 3     famotidine (PEPCID) 20 MG tablet Take 20 mg by mouth 2 times daily       fluocinonide (LIDEX) 0.05 % external solution apply to scalp twice daily as needed       hydrocortisone 2.5 % cream Apply a thin layer to affected skin twice a day for up to  3-4 weeks face only 1 week       ketoconazole (NIZORAL) 2 % external shampoo Apply to scalp 2-3 times per week for 5 minutes, lather, and then rinse out.       melatonin 3 MG CAPS Take 5 mg by mouth as needed Time release       Nutritional Supplements (IMMUNE ENHANCE PO) daily        omeprazole (PRILOSEC) 20 MG DR capsule Take 20 mg by mouth as needed       OTEZLA 10 & 20 & 30 MG TBPK        traZODone (DESYREL) 50 MG tablet Take 100 mg by mouth At Bedtime         Physical Examination:  There were no vitals taken for this visit.  Wt Readings from Last 10 Encounters:   03/28/24 72.1 kg (158 lb 14.4 oz)   03/27/23 73.4 kg (161 lb 14.4 oz)   03/07/22 71.4 kg (157 lb 8 oz)   06/07/21 73.8 kg (162 lb 11.2 oz)   02/01/21 72.6 kg (160 lb)   06/19/20 71.8 kg (158 lb 3.2 oz)   01/13/20 74.4 kg (164 lb)   06/03/19 75.8 kg (167 lb 3.2 oz)   12/03/18 73.7 kg (162 lb 8 oz)   05/25/18 73.7 kg (162 lb 8 oz)   General: No acute distress  HEENT: Sclera anicteric. Oral mucosa pink and moist.  No mucositis or thrush  Lymph: No lymphadenopathy  in neck  Heart: Regular, rate, and rhythm  Breast:   No palpable abnormalities in left or right breast. No other skin concerns on either breast.  Lungs: Clear to ascultation bilaterally  Abdomen: Positive bowel sounds. Soft, non-distended, non-tender. No organomegaly or mass.   Extremities: no lower extremity edema  Neuro: Cranial nerves grossly intact  Rash: none    Laboratory Data:     Mammogram today - reviewed personally with radiology and with patient.  Benign.    Assessment and Plan:  85 year old female with history of invasive ductal carcinoma left breast T1c N0, ER positive, CA positive, HER2 negative (equivocal by FISH, and 1+ by IHC).  Oncotype score was 19 (risk of distant recurrence assuming she takes tamoxifen for 5 years of 12% with tamoxifen alone). Underwent lumpectomy w/ negative SLND followed by XRT. Currently on Arimidex since 5/2016.     -She underwent a screening mammogram today which was benign. I personally reviewed her breast imaging together with the radiology team who does not see anything on mammogram to explain her discomfort.   - original oncotype dx of 19 as well as her breast cancer index.  Breast cancer index demonstrated a risk of late recurrence of 7%, and there is a potential for benefit from longterm benefit from endocrine therapy. She continues on arimidex at this time, planning for 10 years which will be through 05/2026.      Bone health: Normal bone density in the last two years.  Continue of vitamin D and calcium supplementation in the setting of Arimidex use. Continue exercising.    Follow up: RTC in one year     30 minutes spent on the date of the encounter doing chart review, review of test results, interpretation of tests, patient visit, and documentation      Cindy Alves MD     The longitudinal plan of care for the diagnosis(es)/condition(s) as documented were addressed during this visit. Due to the added complexity in care, I will continue to support Rahdika in the  subsequent management and with ongoing continuity of care.             Again, thank you for allowing me to participate in the care of your patient.        Sincerely,        Cindy Alves MD    Electronically signed

## 2025-04-07 NOTE — NURSING NOTE
"Oncology Rooming Note    April 7, 2025 9:23 AM   Radhika Williamson is a 85 year old female who presents for:    Chief Complaint   Patient presents with    Oncology Clinic Visit     RTN malignant neoplasm of left breast      Initial Vitals: /73 (BP Location: Right arm, Patient Position: Sitting, Cuff Size: Adult Regular)   Pulse 61   Temp 97.6  F (36.4  C)   Resp 16   Ht 1.71 m (5' 7.32\")   Wt 64.6 kg (142 lb 8 oz)   SpO2 99%   BMI 22.11 kg/m   Estimated body mass index is 22.11 kg/m  as calculated from the following:    Height as of this encounter: 1.71 m (5' 7.32\").    Weight as of this encounter: 64.6 kg (142 lb 8 oz). Body surface area is 1.75 meters squared.  No Pain (0) Comment: Data Unavailable   No LMP recorded. Patient is postmenopausal.  Allergies reviewed: Yes  Medications reviewed: Yes    Medications: Medication refills not needed today.  Pharmacy name entered into [facility]: [id number] - [place], [place] - [address] AT [facility] [address]    Frailty Screening:   Is the patient here for a new oncology consult visit in cancer care? 2. No    PHQ9:  Did this patient require a PHQ9?: No      Clinical concerns: Patient reports concerned Otezla may caused her to lose 20 lbs.       Myra Lopez CMA              "

## 2025-04-07 NOTE — PROGRESS NOTES
Oncology/Hematology Visit Note  Apr 7, 2025    Reason for Visit: Follow up of left breast cancer    Oncologic history summary:  Oncologic diagnosis and prognostic factors:  Postmenopausal, left breast invasive ductal carcinoma, ER/GA+ HER2 negative, stage IA, Dx in 12/2015  - Abnormal screening mammography in 12/2015, Bx confirmed invasive ductal carcinoma, grade 2, associated w/ DCIS, ER/GA+, HER2 negative  - Lumpectomy w/ SLND on 12/24/16; invasive ductal carcinoma, grade 3, 1.7x1.7x1.6 cm, margin negative, ER+ 90%, GA+ 70%, HER2 negative by IHC, negative LN; pT1cN0  - Complete adjuvant radiation   - Oncotype Dx score 19    Treatment:  Arimidex started 5/7/16- current.     Interval History:  Jono returns clinic today for follow-up of stage I left breast cancer currently on Arimidex since 5/2016.  She denies any new concerns today.  She continues to tolerate anastrozole very well.  She denies any side effects since starting this in 2016.  She has no chest pain, shortness of breath, or cough.  No new breast concerns today.  No new bone pains.  No nausea or vomiting.  No headaches.  She remains active with swimming and yoga.  She recently took a three week hiking trip to [place].  It was glorious.  She physically felt very well.    Cancer Staging   Malignant neoplasm of left breast (H)  Staging form: Breast, AJCC 7th Edition  - Clinical stage from 3/24/2016: Stage IA (T1c, N0, M0)  Estrogen receptor status: Positive  Progesterone receptor status: Positive  HER2 status: Equivocal  IHC of regional lymph nodes: Not assessed  Multi-gene signature risk of recurrence: Intermediate risk      Current Therapy: [No matching plan found]  Current Disease Status : No evidence of disease (DIANA)  ECOG Performance Status : 0 - Asymptomatic  Intent of Therapy: Curative  Intent to Change : No   No new medical concerns and otherwise review of systems negative.     Review of Systems:   ROS: 10 point ROS neg other than the symptoms  noted above in the HPI    Current Outpatient Medications   Medication Sig Dispense Refill    anastrozole (ARIMIDEX) 1 MG tablet Take 1 tablet (1 mg) by mouth daily 90 tablet 3    Biotin 5000 MCG CAPS       Calcium Citrate-Vitamin D (CALCIUM CITRATE+D3 PO)       Cholecalciferol (VITAMIN D3 PO) Take 2,000 Units by mouth daily       desonide (DESOWEN) 0.05 % external ointment Apply topically 2 times daily 60 g 3    famotidine (PEPCID) 20 MG tablet Take 20 mg by mouth 2 times daily      fluocinonide (LIDEX) 0.05 % external solution apply to scalp twice daily as needed      hydrocortisone 2.5 % cream Apply a thin layer to affected skin twice a day for up to  3-4 weeks face only 1 week      ketoconazole (NIZORAL) 2 % external shampoo Apply to scalp 2-3 times per week for 5 minutes, lather, and then rinse out.      melatonin 3 MG CAPS Take 5 mg by mouth as needed Time release      Nutritional Supplements (IMMUNE ENHANCE PO) daily       omeprazole (PRILOSEC) 20 MG DR capsule Take 20 mg by mouth as needed      OTEZLA 10 & 20 & 30 MG TBPK       traZODone (DESYREL) 50 MG tablet Take 100 mg by mouth At Bedtime         Physical Examination:  There were no vitals taken for this visit.  Wt Readings from Last 10 Encounters:   03/28/24 72.1 kg (158 lb 14.4 oz)   03/27/23 73.4 kg (161 lb 14.4 oz)   03/07/22 71.4 kg (157 lb 8 oz)   06/07/21 73.8 kg (162 lb 11.2 oz)   02/01/21 72.6 kg (160 lb)   06/19/20 71.8 kg (158 lb 3.2 oz)   01/13/20 74.4 kg (164 lb)   06/03/19 75.8 kg (167 lb 3.2 oz)   12/03/18 73.7 kg (162 lb 8 oz)   05/25/18 73.7 kg (162 lb 8 oz)   General: No acute distress  HEENT: Sclera anicteric. Oral mucosa pink and moist.  No mucositis or thrush  Lymph: No lymphadenopathy in neck  Heart: Regular, rate, and rhythm  Breast:   No palpable abnormalities in left or right breast. No other skin concerns on either breast.  Lungs: Clear to ascultation bilaterally  Abdomen: Positive bowel sounds. Soft, non-distended, non-tender. No  organomegaly or mass.   Extremities: no lower extremity edema  Neuro: Cranial nerves grossly intact  Rash: none    Laboratory Data:     Mammogram today - reviewed personally with radiology and with patient.  Benign.    Assessment and Plan:  85 year old female with history of invasive ductal carcinoma left breast T1c N0, ER positive, MI positive, HER2 negative (equivocal by FISH, and 1+ by IHC).  Oncotype score was 19 (risk of distant recurrence assuming she takes tamoxifen for 5 years of 12% with tamoxifen alone). Underwent lumpectomy w/ negative SLND followed by XRT. Currently on Arimidex since 5/2016.     -She underwent a screening mammogram today which was benign. I personally reviewed her breast imaging together with the radiology team who does not see anything on mammogram to explain her discomfort.   - original oncotype dx of 19 as well as her breast cancer index.  Breast cancer index demonstrated a risk of late recurrence of 7%, and there is a potential for benefit from longterm benefit from endocrine therapy. She continues on arimidex at this time, planning for 10 years which will be through 05/2026.      Bone health: Normal bone density in the last two years.  Continue of vitamin D and calcium supplementation in the setting of Arimidex use. Continue exercising.    Follow up: RTC in one year     30 minutes spent on the date of the encounter doing chart review, review of test results, interpretation of tests, patient visit, and documentation      Cindy Alves MD     The longitudinal plan of care for the diagnosis(es)/condition(s) as documented were addressed during this visit. Due to the added complexity in care, I will continue to support Radhika in the subsequent management and with ongoing continuity of care.

## 2025-04-14 DIAGNOSIS — C50.512 MALIGNANT NEOPLASM OF LOWER-OUTER QUADRANT OF LEFT BREAST OF FEMALE, ESTROGEN RECEPTOR POSITIVE (H): ICD-10-CM

## 2025-04-14 DIAGNOSIS — Z17.0 MALIGNANT NEOPLASM OF LOWER-OUTER QUADRANT OF LEFT BREAST OF FEMALE, ESTROGEN RECEPTOR POSITIVE (H): ICD-10-CM

## 2025-04-14 RX ORDER — ANASTROZOLE 1 MG/1
1 TABLET ORAL DAILY
Qty: 90 TABLET | Refills: 3 | Status: SHIPPED | OUTPATIENT
Start: 2025-04-14

## 2025-07-19 ENCOUNTER — HEALTH MAINTENANCE LETTER (OUTPATIENT)
Age: 85
End: 2025-07-19